# Patient Record
Sex: FEMALE | Race: WHITE | NOT HISPANIC OR LATINO
[De-identification: names, ages, dates, MRNs, and addresses within clinical notes are randomized per-mention and may not be internally consistent; named-entity substitution may affect disease eponyms.]

---

## 2020-05-01 ENCOUNTER — TRANSCRIPTION ENCOUNTER (OUTPATIENT)
Age: 85
End: 2020-05-01

## 2024-11-26 ENCOUNTER — NON-APPOINTMENT (OUTPATIENT)
Age: 89
End: 2024-11-26

## 2024-11-26 ENCOUNTER — APPOINTMENT (OUTPATIENT)
Dept: UROLOGY | Facility: CLINIC | Age: 89
End: 2024-11-26
Payer: MEDICARE

## 2024-11-26 VITALS
RESPIRATION RATE: 16 BRPM | OXYGEN SATURATION: 96 % | DIASTOLIC BLOOD PRESSURE: 82 MMHG | SYSTOLIC BLOOD PRESSURE: 142 MMHG | BODY MASS INDEX: 31.15 KG/M2 | WEIGHT: 165 LBS | HEIGHT: 61 IN | HEART RATE: 65 BPM

## 2024-11-26 PROCEDURE — 99204 OFFICE O/P NEW MOD 45 MIN: CPT

## 2024-11-26 PROCEDURE — G2211 COMPLEX E/M VISIT ADD ON: CPT

## 2024-11-26 RX ORDER — METHENAMINE HIPPURATE 1 G/1
1 TABLET ORAL DAILY
Qty: 30 | Refills: 5 | Status: ACTIVE | COMMUNITY
Start: 2024-11-26 | End: 1900-01-01

## 2024-11-26 RX ORDER — CONJUGATED ESTROGENS 0.62 MG/G
0.62 CREAM VAGINAL
Qty: 1 | Refills: 2 | Status: ACTIVE | COMMUNITY
Start: 2024-11-26 | End: 1900-01-01

## 2024-11-27 LAB
APPEARANCE: CLEAR
BACTERIA: NEGATIVE /HPF
BILIRUBIN URINE: NEGATIVE
BLOOD URINE: NEGATIVE
CAST: 2 /LPF
COLOR: YELLOW
EPITHELIAL CELLS: 2 /HPF
GLUCOSE QUALITATIVE U: NEGATIVE MG/DL
KETONES URINE: NEGATIVE MG/DL
LEUKOCYTE ESTERASE URINE: ABNORMAL
MICROSCOPIC-UA: NORMAL
NITRITE URINE: NEGATIVE
PH URINE: 7
PROTEIN URINE: NEGATIVE MG/DL
RED BLOOD CELLS URINE: 2 /HPF
SPECIFIC GRAVITY URINE: 1.01
UROBILINOGEN URINE: 0.2 MG/DL
WHITE BLOOD CELLS URINE: 85 /HPF

## 2024-11-29 LAB — BACTERIA UR CULT: NORMAL

## 2024-12-05 ENCOUNTER — OUTPATIENT (OUTPATIENT)
Dept: OUTPATIENT SERVICES | Facility: HOSPITAL | Age: 89
LOS: 1 days | End: 2024-12-05
Payer: MEDICARE

## 2024-12-05 ENCOUNTER — APPOINTMENT (OUTPATIENT)
Dept: UROLOGY | Facility: CLINIC | Age: 89
End: 2024-12-05
Payer: MEDICARE

## 2024-12-05 VITALS
RESPIRATION RATE: 16 BRPM | SYSTOLIC BLOOD PRESSURE: 141 MMHG | OXYGEN SATURATION: 96 % | BODY MASS INDEX: 31.15 KG/M2 | HEART RATE: 79 BPM | HEIGHT: 61 IN | DIASTOLIC BLOOD PRESSURE: 84 MMHG | WEIGHT: 165 LBS

## 2024-12-05 DIAGNOSIS — Z98.49 CATARACT EXTRACTION STATUS, UNSPECIFIED EYE: Chronic | ICD-10-CM

## 2024-12-05 DIAGNOSIS — Z96.659 PRESENCE OF UNSPECIFIED ARTIFICIAL KNEE JOINT: Chronic | ICD-10-CM

## 2024-12-05 DIAGNOSIS — N39.0 URINARY TRACT INFECTION, SITE NOT SPECIFIED: ICD-10-CM

## 2024-12-05 DIAGNOSIS — R35.0 FREQUENCY OF MICTURITION: ICD-10-CM

## 2024-12-05 LAB — FUNGUS SPEC CULT ORG #8: NORMAL

## 2024-12-05 PROCEDURE — 51702 INSERT TEMP BLADDER CATH: CPT

## 2024-12-06 ENCOUNTER — NON-APPOINTMENT (OUTPATIENT)
Age: 89
End: 2024-12-06

## 2024-12-06 LAB
APPEARANCE: ABNORMAL
BACTERIA: ABNORMAL /HPF
BILIRUBIN URINE: NEGATIVE
BLOOD URINE: ABNORMAL
CAST: 2 /LPF
COLOR: YELLOW
EPITHELIAL CELLS: 1 /HPF
GLUCOSE QUALITATIVE U: NEGATIVE MG/DL
KETONES URINE: NEGATIVE MG/DL
LEUKOCYTE ESTERASE URINE: ABNORMAL
MICROSCOPIC-UA: NORMAL
NITRITE URINE: NEGATIVE
PH URINE: 6
PROTEIN URINE: 100 MG/DL
RED BLOOD CELLS URINE: 64 /HPF
SPECIFIC GRAVITY URINE: 1.02
UROBILINOGEN URINE: 0.2 MG/DL
WHITE BLOOD CELLS URINE: >998 /HPF

## 2024-12-06 RX ORDER — AMOXICILLIN/POTASSIUM CLAV 875-125 MG
1 TABLET ORAL
Qty: 14 | Refills: 0 | DISCHARGE
Start: 2024-12-06 | End: 2024-12-12

## 2024-12-06 RX ORDER — AMOXICILLIN/POTASSIUM CLAV 250-125 MG
1 TABLET ORAL
Qty: 14 | Refills: 0
Start: 2024-12-06 | End: 2024-12-12

## 2024-12-09 ENCOUNTER — NON-APPOINTMENT (OUTPATIENT)
Age: 89
End: 2024-12-09

## 2024-12-10 DIAGNOSIS — N39.0 URINARY TRACT INFECTION, SITE NOT SPECIFIED: ICD-10-CM

## 2024-12-10 LAB — BACTERIA UR CULT: ABNORMAL

## 2024-12-26 ENCOUNTER — INPATIENT (INPATIENT)
Facility: HOSPITAL | Age: 89
LOS: 3 days | Discharge: HOME HEALTH | DRG: 690 | End: 2024-12-30
Attending: STUDENT IN AN ORGANIZED HEALTH CARE EDUCATION/TRAINING PROGRAM | Admitting: STUDENT IN AN ORGANIZED HEALTH CARE EDUCATION/TRAINING PROGRAM
Payer: MEDICARE

## 2024-12-26 VITALS
DIASTOLIC BLOOD PRESSURE: 66 MMHG | TEMPERATURE: 98 F | OXYGEN SATURATION: 100 % | RESPIRATION RATE: 18 BRPM | WEIGHT: 165.35 LBS | HEART RATE: 66 BPM | SYSTOLIC BLOOD PRESSURE: 148 MMHG

## 2024-12-26 DIAGNOSIS — Z98.49 CATARACT EXTRACTION STATUS, UNSPECIFIED EYE: Chronic | ICD-10-CM

## 2024-12-26 DIAGNOSIS — Z96.659 PRESENCE OF UNSPECIFIED ARTIFICIAL KNEE JOINT: Chronic | ICD-10-CM

## 2024-12-26 LAB
ALBUMIN SERPL ELPH-MCNC: 3.3 G/DL — SIGNIFICANT CHANGE UP (ref 3.3–5)
ALP SERPL-CCNC: 78 U/L — SIGNIFICANT CHANGE UP (ref 40–120)
ALT FLD-CCNC: 13 U/L — SIGNIFICANT CHANGE UP (ref 12–78)
ANION GAP SERPL CALC-SCNC: 5 MMOL/L — SIGNIFICANT CHANGE UP (ref 5–17)
APTT BLD: 33.8 SEC — SIGNIFICANT CHANGE UP (ref 24.5–35.6)
AST SERPL-CCNC: 18 U/L — SIGNIFICANT CHANGE UP (ref 15–37)
BASOPHILS # BLD AUTO: 0.07 K/UL — SIGNIFICANT CHANGE UP (ref 0–0.2)
BASOPHILS # BLD AUTO: 0.09 K/UL — SIGNIFICANT CHANGE UP (ref 0–0.2)
BASOPHILS NFR BLD AUTO: 0.4 % — SIGNIFICANT CHANGE UP (ref 0–2)
BASOPHILS NFR BLD AUTO: 0.5 % — SIGNIFICANT CHANGE UP (ref 0–2)
BILIRUB SERPL-MCNC: 1.4 MG/DL — HIGH (ref 0.2–1.2)
BUN SERPL-MCNC: 28 MG/DL — HIGH (ref 7–23)
CALCIUM SERPL-MCNC: 9 MG/DL — SIGNIFICANT CHANGE UP (ref 8.5–10.1)
CHLORIDE SERPL-SCNC: 101 MMOL/L — SIGNIFICANT CHANGE UP (ref 96–108)
CO2 SERPL-SCNC: 28 MMOL/L — SIGNIFICANT CHANGE UP (ref 22–31)
CREAT SERPL-MCNC: 1.44 MG/DL — HIGH (ref 0.5–1.3)
EGFR: 35 ML/MIN/1.73M2 — LOW
EOSINOPHIL # BLD AUTO: 0.12 K/UL — SIGNIFICANT CHANGE UP (ref 0–0.5)
EOSINOPHIL # BLD AUTO: 0.15 K/UL — SIGNIFICANT CHANGE UP (ref 0–0.5)
EOSINOPHIL NFR BLD AUTO: 0.7 % — SIGNIFICANT CHANGE UP (ref 0–6)
EOSINOPHIL NFR BLD AUTO: 0.9 % — SIGNIFICANT CHANGE UP (ref 0–6)
GLUCOSE SERPL-MCNC: 152 MG/DL — HIGH (ref 70–99)
HCT VFR BLD CALC: 39.1 % — SIGNIFICANT CHANGE UP (ref 34.5–45)
HCT VFR BLD CALC: 40.4 % — SIGNIFICANT CHANGE UP (ref 34.5–45)
HGB BLD-MCNC: 13.1 G/DL — SIGNIFICANT CHANGE UP (ref 11.5–15.5)
HGB BLD-MCNC: 13.4 G/DL — SIGNIFICANT CHANGE UP (ref 11.5–15.5)
IMM GRANULOCYTES NFR BLD AUTO: 0.5 % — SIGNIFICANT CHANGE UP (ref 0–0.9)
IMM GRANULOCYTES NFR BLD AUTO: 0.6 % — SIGNIFICANT CHANGE UP (ref 0–0.9)
INR BLD: 0.91 RATIO — SIGNIFICANT CHANGE UP (ref 0.85–1.16)
LACTATE SERPL-SCNC: 1.2 MMOL/L — SIGNIFICANT CHANGE UP (ref 0.7–2)
LACTATE SERPL-SCNC: 1.4 MMOL/L — SIGNIFICANT CHANGE UP (ref 0.7–2)
LYMPHOCYTES # BLD AUTO: 0.7 K/UL — LOW (ref 1–3.3)
LYMPHOCYTES # BLD AUTO: 0.74 K/UL — LOW (ref 1–3.3)
LYMPHOCYTES # BLD AUTO: 4.2 % — LOW (ref 13–44)
LYMPHOCYTES # BLD AUTO: 4.2 % — LOW (ref 13–44)
MAGNESIUM SERPL-MCNC: 2.6 MG/DL — SIGNIFICANT CHANGE UP (ref 1.6–2.6)
MCHC RBC-ENTMCNC: 32.9 PG — SIGNIFICANT CHANGE UP (ref 27–34)
MCHC RBC-ENTMCNC: 33.2 G/DL — SIGNIFICANT CHANGE UP (ref 32–36)
MCHC RBC-ENTMCNC: 33.2 PG — SIGNIFICANT CHANGE UP (ref 27–34)
MCHC RBC-ENTMCNC: 33.5 G/DL — SIGNIFICANT CHANGE UP (ref 32–36)
MCV RBC AUTO: 99.2 FL — SIGNIFICANT CHANGE UP (ref 80–100)
MCV RBC AUTO: 99.3 FL — SIGNIFICANT CHANGE UP (ref 80–100)
MONOCYTES # BLD AUTO: 1.37 K/UL — HIGH (ref 0–0.9)
MONOCYTES # BLD AUTO: 1.48 K/UL — HIGH (ref 0–0.9)
MONOCYTES NFR BLD AUTO: 8.2 % — SIGNIFICANT CHANGE UP (ref 2–14)
MONOCYTES NFR BLD AUTO: 8.4 % — SIGNIFICANT CHANGE UP (ref 2–14)
NEUTROPHILS # BLD AUTO: 14.34 K/UL — HIGH (ref 1.8–7.4)
NEUTROPHILS # BLD AUTO: 15.12 K/UL — HIGH (ref 1.8–7.4)
NEUTROPHILS NFR BLD AUTO: 85.6 % — HIGH (ref 43–77)
NEUTROPHILS NFR BLD AUTO: 85.8 % — HIGH (ref 43–77)
PLATELET # BLD AUTO: 169 K/UL — SIGNIFICANT CHANGE UP (ref 150–400)
PLATELET # BLD AUTO: 172 K/UL — SIGNIFICANT CHANGE UP (ref 150–400)
POTASSIUM SERPL-MCNC: 4.5 MMOL/L — SIGNIFICANT CHANGE UP (ref 3.5–5.3)
POTASSIUM SERPL-SCNC: 4.5 MMOL/L — SIGNIFICANT CHANGE UP (ref 3.5–5.3)
PROT SERPL-MCNC: 6.8 GM/DL — SIGNIFICANT CHANGE UP (ref 6–8.3)
PROTHROM AB SERPL-ACNC: 10.7 SEC — SIGNIFICANT CHANGE UP (ref 9.9–13.4)
RBC # BLD: 3.94 M/UL — SIGNIFICANT CHANGE UP (ref 3.8–5.2)
RBC # BLD: 4.07 M/UL — SIGNIFICANT CHANGE UP (ref 3.8–5.2)
RBC # FLD: 13.8 % — SIGNIFICANT CHANGE UP (ref 10.3–14.5)
RBC # FLD: 13.8 % — SIGNIFICANT CHANGE UP (ref 10.3–14.5)
SODIUM SERPL-SCNC: 134 MMOL/L — LOW (ref 135–145)
WBC # BLD: 16.75 K/UL — HIGH (ref 3.8–10.5)
WBC # BLD: 17.61 K/UL — HIGH (ref 3.8–10.5)
WBC # FLD AUTO: 16.75 K/UL — HIGH (ref 3.8–10.5)
WBC # FLD AUTO: 17.61 K/UL — HIGH (ref 3.8–10.5)

## 2024-12-26 PROCEDURE — 71045 X-RAY EXAM CHEST 1 VIEW: CPT | Mod: 26

## 2024-12-26 PROCEDURE — 99285 EMERGENCY DEPT VISIT HI MDM: CPT

## 2024-12-26 RX ORDER — CEFTRIAXONE SODIUM 1 G/1
1000 INJECTION, POWDER, FOR SOLUTION INTRAMUSCULAR; INTRAVENOUS ONCE
Refills: 0 | Status: DISCONTINUED | OUTPATIENT
Start: 2024-12-26 | End: 2024-12-26

## 2024-12-26 RX ORDER — ACETAMINOPHEN 80 MG/.8ML
650 SOLUTION/ DROPS ORAL ONCE
Refills: 0 | Status: COMPLETED | OUTPATIENT
Start: 2024-12-26 | End: 2024-12-26

## 2024-12-26 RX ORDER — CEFTRIAXONE SODIUM 1 G/1
1000 INJECTION, POWDER, FOR SOLUTION INTRAMUSCULAR; INTRAVENOUS ONCE
Refills: 0 | Status: COMPLETED | OUTPATIENT
Start: 2024-12-26 | End: 2024-12-26

## 2024-12-26 RX ORDER — SODIUM CHLORIDE 9 MG/ML
250 INJECTION, SOLUTION INTRAMUSCULAR; INTRAVENOUS; SUBCUTANEOUS ONCE
Refills: 0 | Status: COMPLETED | OUTPATIENT
Start: 2024-12-26 | End: 2024-12-26

## 2024-12-26 RX ORDER — SODIUM CHLORIDE 9 MG/ML
1500 INJECTION, SOLUTION INTRAMUSCULAR; INTRAVENOUS; SUBCUTANEOUS ONCE
Refills: 0 | Status: DISCONTINUED | OUTPATIENT
Start: 2024-12-26 | End: 2024-12-26

## 2024-12-26 RX ADMIN — CEFTRIAXONE SODIUM 1000 MILLIGRAM(S): 1 INJECTION, POWDER, FOR SOLUTION INTRAMUSCULAR; INTRAVENOUS at 23:04

## 2024-12-26 RX ADMIN — ACETAMINOPHEN 650 MILLIGRAM(S): 80 SOLUTION/ DROPS ORAL at 22:59

## 2024-12-26 RX ADMIN — SODIUM CHLORIDE 250 MILLILITER(S): 9 INJECTION, SOLUTION INTRAMUSCULAR; INTRAVENOUS; SUBCUTANEOUS at 22:59

## 2024-12-26 NOTE — ED PROVIDER NOTE - CLINICAL SUMMARY MEDICAL DECISION MAKING FREE TEXT BOX
88 y/o F with PMHx of GERD, OA, HTN, HLD, Afib s/p cardioversion, DM, CHF, chronic UTIs presents to ED  c/o fever, tiredness, and confusion starting today.  Pt states she has cloudy urine. Last UTI was 3 weeks ago and was treated with amoxiclav for 1 week which concluded 2 weeks ago. Pt notes temp of 100.8F at 6pm at home today. Last took tylenol at 5pm today. Denies dysuria, abd pain, cough, runny nose, sore throat. Allergy to bactrim and levaquin. Pt is on Lasix alternating 20mg/40mg, sotalol 80 mg, lovastatin.     Urologist: Dr. Dylan Raphael at [place].   PCP: Dr. Márquez    Plan: Do labs, urine, CXR, blood culture 88 y/o F with PMHx of GERD, OA, HTN, HLD, Afib s/p cardioversion, DM, CHF, chronic UTIs presents to ED  c/o fever, tiredness, and confusion starting today.  Pt states she has cloudy urine. Last UTI was 3 weeks ago and was treated with amoxiclav for 1 week which concluded 2 weeks ago. Pt notes temp of 100.8F at 6pm at home today. Last took tylenol 1000mg at 5pm today. Denies dysuria, abd pain, cough, runny nose, sore throat. Allergy to bactrim and levaquin. Pt is on Lasix alternating 20mg/40mg, sotalol 80 mg, lovastatin.     Urologist: Dr. Dylan Raphael at [place].   PCP: Dr. Márquez    Plan: R/o UTI, sepsis. Give IV fluids limited secondary to CHF. Pt denies allergy to penicillin. Order ceftriaxone for UTI coverage. Do Labs urine reassess

## 2024-12-26 NOTE — ED PROVIDER NOTE - NSICDXPASTMEDICALHX_GEN_ALL_CORE_FT
PAST MEDICAL HISTORY:  Atrial fibrillation s/p cardioversion    Diabetes     GERD (gastroesophageal reflux disease)     HTN (hypertension)     Hyperlipidemia low HDL    Osteoarthritis       Atrial fibrillation status post cardioversion      PAST MEDICAL HISTORY:  Atrial fibrillation s/p cardioversion    Diabetes     GERD (gastroesophageal reflux disease)     HTN (hypertension)     Hyperlipidemia low HDL    Osteoarthritis       Atrial fibrillation status post cardioversion     Congestive heart failure

## 2024-12-26 NOTE — ED ADULT TRIAGE NOTE - CHIEF COMPLAINT QUOTE
chronic UTIs, last 2 weeks ago treated with amoxiclav. complains of fever, tiredness and mild AMS today, temp of 100.8 at 6pm. last took tylenol at 5pm.   urologist: dr thierry evans at [place]. history of chronic UTIs leading to sepsis, spoke to dr. suarez told to come to ED.

## 2024-12-27 ENCOUNTER — TRANSCRIPTION ENCOUNTER (OUTPATIENT)
Age: 89
End: 2024-12-27

## 2024-12-27 DIAGNOSIS — N39.0 URINARY TRACT INFECTION, SITE NOT SPECIFIED: ICD-10-CM

## 2024-12-27 LAB
ANION GAP SERPL CALC-SCNC: 3 MMOL/L — LOW (ref 5–17)
APPEARANCE UR: ABNORMAL
BILIRUB UR-MCNC: NEGATIVE — SIGNIFICANT CHANGE UP
BUN SERPL-MCNC: 27 MG/DL — HIGH (ref 7–23)
CALCIUM SERPL-MCNC: 9 MG/DL — SIGNIFICANT CHANGE UP (ref 8.5–10.1)
CHLORIDE SERPL-SCNC: 102 MMOL/L — SIGNIFICANT CHANGE UP (ref 96–108)
CO2 SERPL-SCNC: 30 MMOL/L — SIGNIFICANT CHANGE UP (ref 22–31)
COLOR SPEC: YELLOW — SIGNIFICANT CHANGE UP
CREAT SERPL-MCNC: 1.34 MG/DL — HIGH (ref 0.5–1.3)
DIFF PNL FLD: ABNORMAL
E COLI DNA BLD POS QL NAA+NON-PROBE: SIGNIFICANT CHANGE UP
EGFR: 38 ML/MIN/1.73M2 — LOW
FLUAV AG NPH QL: SIGNIFICANT CHANGE UP
FLUBV AG NPH QL: SIGNIFICANT CHANGE UP
GLUCOSE SERPL-MCNC: 145 MG/DL — HIGH (ref 70–99)
GLUCOSE UR QL: NEGATIVE MG/DL — SIGNIFICANT CHANGE UP
GRAM STN FLD: ABNORMAL
KETONES UR-MCNC: NEGATIVE MG/DL — SIGNIFICANT CHANGE UP
LEUKOCYTE ESTERASE UR-ACNC: ABNORMAL
MAGNESIUM SERPL-MCNC: 2.5 MG/DL — SIGNIFICANT CHANGE UP (ref 1.6–2.6)
METHOD TYPE: SIGNIFICANT CHANGE UP
NITRITE UR-MCNC: NEGATIVE — SIGNIFICANT CHANGE UP
PH UR: 6.5 — SIGNIFICANT CHANGE UP (ref 5–8)
PHOSPHATE SERPL-MCNC: 2.7 MG/DL — SIGNIFICANT CHANGE UP (ref 2.5–4.5)
POTASSIUM SERPL-MCNC: 4 MMOL/L — SIGNIFICANT CHANGE UP (ref 3.5–5.3)
POTASSIUM SERPL-SCNC: 4 MMOL/L — SIGNIFICANT CHANGE UP (ref 3.5–5.3)
PROT UR-MCNC: 300 MG/DL
RSV RNA NPH QL NAA+NON-PROBE: SIGNIFICANT CHANGE UP
SARS-COV-2 RNA SPEC QL NAA+PROBE: SIGNIFICANT CHANGE UP
SODIUM SERPL-SCNC: 135 MMOL/L — SIGNIFICANT CHANGE UP (ref 135–145)
SP GR SPEC: 1.02 — SIGNIFICANT CHANGE UP (ref 1–1.03)
SPECIMEN SOURCE: SIGNIFICANT CHANGE UP
UROBILINOGEN FLD QL: 0.2 MG/DL — SIGNIFICANT CHANGE UP (ref 0.2–1)

## 2024-12-27 PROCEDURE — 76700 US EXAM ABDOM COMPLETE: CPT

## 2024-12-27 PROCEDURE — 99223 1ST HOSP IP/OBS HIGH 75: CPT | Mod: AI

## 2024-12-27 PROCEDURE — 87040 BLOOD CULTURE FOR BACTERIA: CPT

## 2024-12-27 PROCEDURE — 85027 COMPLETE CBC AUTOMATED: CPT

## 2024-12-27 PROCEDURE — 97116 GAIT TRAINING THERAPY: CPT | Mod: GP

## 2024-12-27 PROCEDURE — 84100 ASSAY OF PHOSPHORUS: CPT

## 2024-12-27 PROCEDURE — 36415 COLL VENOUS BLD VENIPUNCTURE: CPT

## 2024-12-27 PROCEDURE — 97162 PT EVAL MOD COMPLEX 30 MIN: CPT | Mod: GP

## 2024-12-27 PROCEDURE — 93010 ELECTROCARDIOGRAM REPORT: CPT

## 2024-12-27 PROCEDURE — 93005 ELECTROCARDIOGRAM TRACING: CPT

## 2024-12-27 PROCEDURE — 80048 BASIC METABOLIC PNL TOTAL CA: CPT

## 2024-12-27 PROCEDURE — 83735 ASSAY OF MAGNESIUM: CPT

## 2024-12-27 PROCEDURE — 85025 COMPLETE CBC W/AUTO DIFF WBC: CPT

## 2024-12-27 PROCEDURE — 94640 AIRWAY INHALATION TREATMENT: CPT

## 2024-12-27 RX ORDER — ACETAMINOPHEN 80 MG/.8ML
0 SOLUTION/ DROPS ORAL
Refills: 0 | DISCHARGE

## 2024-12-27 RX ORDER — BISACODYL 5 MG
5 TABLET, DELAYED RELEASE (ENTERIC COATED) ORAL AT BEDTIME
Refills: 0 | Status: DISCONTINUED | OUTPATIENT
Start: 2024-12-27 | End: 2024-12-30

## 2024-12-27 RX ORDER — POTASSIUM CHLORIDE 600 MG/1
1 TABLET, FILM COATED, EXTENDED RELEASE ORAL
Refills: 0 | DISCHARGE

## 2024-12-27 RX ORDER — MAG HYDROX/ALUMINUM HYD/SIMETH 200-200-20
30 SUSPENSION, ORAL (FINAL DOSE FORM) ORAL ONCE
Refills: 0 | Status: COMPLETED | OUTPATIENT
Start: 2024-12-27 | End: 2024-12-27

## 2024-12-27 RX ORDER — ESTROGENS, CONJUGATED 0.625 MG/G
1 CREAM (GRAM) VAGINAL
Refills: 0 | DISCHARGE

## 2024-12-27 RX ORDER — SOTALOL HYDROCHLORIDE 160 MG/1
80 TABLET ORAL EVERY 12 HOURS
Refills: 0 | Status: DISCONTINUED | OUTPATIENT
Start: 2024-12-27 | End: 2024-12-28

## 2024-12-27 RX ORDER — CEFTRIAXONE SODIUM 1 G/1
1000 INJECTION, POWDER, FOR SOLUTION INTRAMUSCULAR; INTRAVENOUS EVERY 24 HOURS
Refills: 0 | Status: DISCONTINUED | OUTPATIENT
Start: 2024-12-27 | End: 2024-12-28

## 2024-12-27 RX ORDER — BACILLUS COAGULANS/INULIN 1B-250 MG
2 CAPSULE ORAL
Refills: 0 | DISCHARGE

## 2024-12-27 RX ORDER — LIDOCAINE 50 MG/G
1 OINTMENT TOPICAL EVERY 24 HOURS
Refills: 0 | Status: DISCONTINUED | OUTPATIENT
Start: 2024-12-27 | End: 2024-12-30

## 2024-12-27 RX ORDER — FLUTICASONE PROPIONATE 50 UG/1
2 SPRAY, METERED NASAL
Refills: 0 | DISCHARGE

## 2024-12-27 RX ORDER — DICLOFENAC SODIUM 1 %
4 GEL (GRAM) TOPICAL
Refills: 0 | DISCHARGE

## 2024-12-27 RX ORDER — PANTOPRAZOLE 40 MG/1
1 TABLET, DELAYED RELEASE ORAL
Refills: 0 | DISCHARGE

## 2024-12-27 RX ORDER — IPRATROPIUM BROMIDE 0.2 MG/ML
2 SOLUTION, NON-ORAL INHALATION
Refills: 0 | DISCHARGE

## 2024-12-27 RX ORDER — FAMOTIDINE 20 MG/1
1 TABLET, FILM COATED ORAL
Refills: 0 | DISCHARGE

## 2024-12-27 RX ORDER — SENNOSIDES 8.6 MG/1
2 TABLET, FILM COATED ORAL AT BEDTIME
Refills: 0 | Status: DISCONTINUED | OUTPATIENT
Start: 2024-12-27 | End: 2024-12-30

## 2024-12-27 RX ORDER — LIDOCAINE 50 MG/G
0 OINTMENT TOPICAL
Refills: 0 | DISCHARGE

## 2024-12-27 RX ORDER — SENNOSIDES 8.6 MG/1
1 TABLET, FILM COATED ORAL
Refills: 0 | DISCHARGE

## 2024-12-27 RX ORDER — ASPIRIN 81 MG
1 TABLET, DELAYED RELEASE (ENTERIC COATED) ORAL
Refills: 0 | DISCHARGE

## 2024-12-27 RX ORDER — IBUPROFEN 200 MG
600 TABLET ORAL ONCE
Refills: 0 | Status: COMPLETED | OUTPATIENT
Start: 2024-12-27 | End: 2024-12-27

## 2024-12-27 RX ORDER — ATORVASTATIN CALCIUM 40 MG/1
10 TABLET, FILM COATED ORAL AT BEDTIME
Refills: 0 | Status: DISCONTINUED | OUTPATIENT
Start: 2024-12-27 | End: 2024-12-30

## 2024-12-27 RX ORDER — ACETAMINOPHEN 80 MG/.8ML
650 SOLUTION/ DROPS ORAL EVERY 6 HOURS
Refills: 0 | Status: DISCONTINUED | OUTPATIENT
Start: 2024-12-27 | End: 2024-12-30

## 2024-12-27 RX ORDER — ASCORBIC ACID 1000 MG
500 TABLET ORAL DAILY
Refills: 0 | Status: DISCONTINUED | OUTPATIENT
Start: 2024-12-27 | End: 2024-12-30

## 2024-12-27 RX ORDER — ASCORBIC ACID 1000 MG
1 TABLET ORAL
Refills: 0 | DISCHARGE

## 2024-12-27 RX ORDER — CHOLECALCIFEROL (VITAMIN D3) 10 MCG
400 TABLET ORAL DAILY
Refills: 0 | Status: DISCONTINUED | OUTPATIENT
Start: 2024-12-27 | End: 2024-12-30

## 2024-12-27 RX ORDER — FLUTICASONE PROPIONATE 50 UG/1
1 SPRAY, METERED NASAL
Refills: 0 | Status: DISCONTINUED | OUTPATIENT
Start: 2024-12-27 | End: 2024-12-30

## 2024-12-27 RX ORDER — FERROUS SULFATE 325(65) MG
325 TABLET ORAL DAILY
Refills: 0 | Status: DISCONTINUED | OUTPATIENT
Start: 2024-12-27 | End: 2024-12-30

## 2024-12-27 RX ORDER — PANTOPRAZOLE 40 MG/1
40 TABLET, DELAYED RELEASE ORAL
Refills: 0 | Status: DISCONTINUED | OUTPATIENT
Start: 2024-12-27 | End: 2024-12-30

## 2024-12-27 RX ORDER — POLYETHYLENE GLYCOL 3350 17 G/DOSE
17 POWDER (GRAM) ORAL
Refills: 0 | Status: DISCONTINUED | OUTPATIENT
Start: 2024-12-27 | End: 2024-12-30

## 2024-12-27 RX ORDER — CETIRIZINE HYDROCHLORIDE 5 MG/5ML
1 SYRUP ORAL
Refills: 0 | DISCHARGE

## 2024-12-27 RX ORDER — CHOLECALCIFEROL (VITAMIN D3) 10 MCG
1 TABLET ORAL
Refills: 0 | DISCHARGE

## 2024-12-27 RX ORDER — ASPIRIN 81 MG
81 TABLET, DELAYED RELEASE (ENTERIC COATED) ORAL DAILY
Refills: 0 | Status: DISCONTINUED | OUTPATIENT
Start: 2024-12-27 | End: 2024-12-30

## 2024-12-27 RX ADMIN — Medication 500 MILLIGRAM(S): at 10:51

## 2024-12-27 RX ADMIN — PANTOPRAZOLE 40 MILLIGRAM(S): 40 TABLET, DELAYED RELEASE ORAL at 22:30

## 2024-12-27 RX ADMIN — Medication 30 MILLILITER(S): at 17:05

## 2024-12-27 RX ADMIN — Medication 81 MILLIGRAM(S): at 10:47

## 2024-12-27 RX ADMIN — PANTOPRAZOLE 40 MILLIGRAM(S): 40 TABLET, DELAYED RELEASE ORAL at 10:47

## 2024-12-27 RX ADMIN — Medication 17 GRAM(S): at 10:47

## 2024-12-27 RX ADMIN — CEFTRIAXONE SODIUM 1000 MILLIGRAM(S): 1 INJECTION, POWDER, FOR SOLUTION INTRAMUSCULAR; INTRAVENOUS at 22:29

## 2024-12-27 RX ADMIN — LIDOCAINE 1 PATCH: 50 OINTMENT TOPICAL at 10:48

## 2024-12-27 RX ADMIN — Medication 400 UNIT(S): at 10:50

## 2024-12-27 RX ADMIN — LIDOCAINE 1 PATCH: 50 OINTMENT TOPICAL at 22:00

## 2024-12-27 RX ADMIN — ATORVASTATIN CALCIUM 10 MILLIGRAM(S): 40 TABLET, FILM COATED ORAL at 22:30

## 2024-12-27 RX ADMIN — Medication 5 MILLIGRAM(S): at 22:30

## 2024-12-27 RX ADMIN — SENNOSIDES 2 TABLET(S): 8.6 TABLET, FILM COATED ORAL at 22:30

## 2024-12-27 RX ADMIN — Medication 325 MILLIGRAM(S): at 10:48

## 2024-12-27 RX ADMIN — LIDOCAINE 1 PATCH: 50 OINTMENT TOPICAL at 19:00

## 2024-12-27 RX ADMIN — FLUTICASONE PROPIONATE 1 SPRAY(S): 50 SPRAY, METERED NASAL at 10:50

## 2024-12-27 RX ADMIN — Medication 600 MILLIGRAM(S): at 02:10

## 2024-12-27 NOTE — PATIENT PROFILE ADULT - PATIENT'S SEXUAL ORIENTATION
oriented to person, place and time , normal sensation , short and long term memory intact Withheld/Decline to Answer

## 2024-12-27 NOTE — H&P ADULT - NSHPPHYSICALEXAM_GEN_ALL_CORE
Vitals  T(F): 99.9 (12-27-24 @ 07:45), Max: 101 (12-27-24 @ 00:14)  HR: 62 (12-27-24 @ 10:46) (60 - 106)  BP: 106/56 (12-27-24 @ 10:46) (100/50 - 152/96)  RR: 18 (12-27-24 @ 07:45) (14 - 24)  SpO2: 95% (12-27-24 @ 07:45) (94% - 100%)    PHYSICAL EXAM   Gen: NAD, comfortable, AA&Ox4  HEENT: head atrumatic and normocephalic, EOMI  CVS: +s1, s2, regular rate and rhythm, no murmurs, rubs or gallops  Pulmonary: normal respiratory effort, clear to auscultation b/l, no wheezes/crackles/rhonchi  Abdomen: soft, non-tender, non-distended, +bowel sounds in all 4 quadrants, no masses noted, no guarding or rigidity   Back: no CVA tenderness   Extremities: no pedal edema, pedal pulses palpable  Skin: nl warm and dry, no wounds   Neuro: answering questions appropriately, face symmetric

## 2024-12-27 NOTE — ED ADULT NURSE NOTE - NSFALLRISKINTERV_ED_ALL_ED

## 2024-12-27 NOTE — PATIENT PROFILE ADULT - FALL HARM RISK - HARM RISK INTERVENTIONS

## 2024-12-27 NOTE — ED ADULT NURSE NOTE - OBJECTIVE STATEMENT
pt sent to ED my MD c/o UTI. pt has chronic UTIs, last 2 weeks ago treated with amoxiclav. complains of fever, tiredness and mild AMS today, temp of 100.8 at 6pm. last took tylenol at 5pm. Pt denies abd pain, back pain, n/v/d, CP, SOB at this time.

## 2024-12-27 NOTE — H&P ADULT - HISTORY OF PRESENT ILLNESS
CBC showing WBC 17.61, hemoglobin 13.1, hematocrit 39.1, platelet count 172.  Coagulation factors within normal limits.  Chemistry showing sodium 134, potassium 4.5, chloride 101, carbon dioxide 28, BUN 28, creatinine 1.44 (no prior creatinine on file for baseline), glucose 152, total bilirubin 1.4, LFTs within normal limits.  Initial lactate 1.2, later 1.4.  Urinalysis notable for greater than 998 white blood cells, urine culture pending.  Respiratory panel negative for COVID, flu A/B, RSV.    Imaging:  – AP portable chest x-ray showed no evidence of acute cardiopulmonary process    ED intervention:  – 1 g ceftriaxone given X1  – Motrin 600 mg p.o. X1, Tylenol 650 mg p.o. X1  – 250 cc bolus NS given    Patient admitted to medicine for treatment of urinary tract infection. 89-year-old female past medical history notable for recurrent urinary tract infections, atrial fibrillation status post cardioversion now with watchman (anticoagulation previously discontinued given hemorrhagic bursitis, renal hematoma 2019), hypertension, hyperlipidemia, GERD, esophageal stricture/achalasia status post endoscopic POEM 2017, osteoarthritis bilateral knees and shoulders, degenerative disc disease, was spinal stenosis, sciatica, who presents with 1 day history of feeling unwell with chills at home.  Patient reports that she does not have dysuria at this time but that she feels like she has a urinary tract infection.  Last UTI treated approximately 3 weeks prior, notes that she has been having poor p.o. intake over the last 1-2 days.  Otherwise denies chest pain, shortness of breath, palpitations, nausea, vomiting, or abdominal pain.    CBC showing WBC 17.61, hemoglobin 13.1, hematocrit 39.1, platelet count 172.  Coagulation factors within normal limits.  Chemistry showing sodium 134, potassium 4.5, chloride 101, carbon dioxide 28, BUN 28, creatinine 1.44 (no prior creatinine on file for baseline), glucose 152, total bilirubin 1.4, LFTs within normal limits.  Initial lactate 1.2, later 1.4.  Urinalysis notable for greater than 998 white blood cells, urine culture pending.  Respiratory panel negative for COVID, flu A/B, RSV.    Imaging:  – AP portable chest x-ray showed no evidence of acute cardiopulmonary process    ED intervention:  – 1 g ceftriaxone given X1  – Motrin 600 mg p.o. X1, Tylenol 650 mg p.o. X1  – 250 cc bolus NS given    Patient admitted to medicine for treatment of urinary tract infection.    Outpatient providers:   Cardiology: Dr. Romero ([facility])  Urology: Dr. yDlan Raphael   PCP: Dr. Márquez  89-year-old female past medical history notable for recurrent urinary tract infections, atrial fibrillation status post cardioversion now with watchman (anticoagulation previously discontinued given hemorrhagic bursitis, renal hematoma 2019), hemophilia C, hypertension, hyperlipidemia, GERD, esophageal stricture/achalasia status post endoscopic POEM 2017, osteoarthritis bilateral knees and shoulders, degenerative disc disease, was spinal stenosis, sciatica, who presents with 1 day history of feeling unwell with chills at home.  Patient reports that she does not have dysuria at this time but that she feels like she has a urinary tract infection.  Last UTI treated approximately 3 weeks prior, notes that she has been having poor p.o. intake over the last 1-2 days.  Otherwise denies chest pain, shortness of breath, palpitations, nausea, vomiting, or abdominal pain.    CBC showing WBC 17.61, hemoglobin 13.1, hematocrit 39.1, platelet count 172.  Coagulation factors within normal limits.  Chemistry showing sodium 134, potassium 4.5, chloride 101, carbon dioxide 28, BUN 28, creatinine 1.44 (no prior creatinine on file for baseline), glucose 152, total bilirubin 1.4, LFTs within normal limits.  Initial lactate 1.2, later 1.4.  Urinalysis notable for greater than 998 white blood cells, urine culture pending.  Respiratory panel negative for COVID, flu A/B, RSV.    Imaging:  – AP portable chest x-ray showed no evidence of acute cardiopulmonary process    ED intervention:  – 1 g ceftriaxone given X1  – Motrin 600 mg p.o. X1, Tylenol 650 mg p.o. X1  – 250 cc bolus NS given    Patient admitted to medicine for treatment of urinary tract infection.    Outpatient providers:   Cardiology: Dr. Romero ([facility])  Urology: Dr. Dylan Raphael   PCP: Dr. Márquez

## 2024-12-27 NOTE — H&P ADULT - NSHPREVIEWOFSYSTEMS_GEN_ALL_CORE
REVIEW OF SYSTEM    General: +chills, +fevers  Ophthalmologic: no acute visual symptoms  ENMT: Denies nasal congestion, difficulty swallowing   Respiratory and Thorax: denies cough or difficulty breathing  Cardiovascular: denies chest pain, denies SOB w exertion  Gastrointestinal: +constipation, denies diarrhea or abdominal pain   Genitourinary: denies dysuria   Musculoskeletal: +pain bilateral shoulders and knees	  Neurological: denies any focal weakness, confusion 	  Psychiatric: Denies depression/anxiety

## 2024-12-27 NOTE — PATIENT PROFILE ADULT - NSPROHMSYMPCOND_GEN_A_NUR
Patient states she has a home health aid to help her with ADLs due to loss of strength and dexterity in upper extremities/musculoskeletal

## 2024-12-27 NOTE — H&P ADULT - ASSESSMENT
89-year-old female past medical history notable for recurrent urinary tract infections, atrial fibrillation status post cardioversion now with watchman (anticoagulation previously discontinued given hemorrhagic bursitis, renal hematoma 2019), hypertension, hyperlipidemia, GERD, esophageal stricture/achalasia status post endoscopic POEM 2017, osteoarthritis bilateral knees and shoulders, degenerative disc disease, was spinal stenosis, sciatica, who presents with 1 day history of feeling unwell with chills at home.     Tmax 101 °F in ED, patient reports her presentation similar to prior urinary tract infections.    #Urinary tract infection, complicated (systemic symptoms including fever, history of recurrent infections)  #Sepsis, POA  – Patient endorses present symptoms consistent with prior UTIs  – Labs notable for WBC 17.61 on admission, urinalysis with greater than 998 WBCs  – Chest x-ray negative for any acute cardiopulmonary process, respiratory viral panel negative  – Will continue with ceftriaxone for now, follow-up urine cultures and blood cultures    #Acute kidney injury, likely prerenal  – Patient is unsure of her baseline creatinine but denies any history of CKD  – Creatinine 1.44 on admission, patient reports poor p.o. intake on days prior to admission  – Suspect prerenal etiology will continue to monitor    #Elevated total bilirubin  – Patient without evidence of positive Jacobsen's on physical exam  – States that she is eating and drinking well without difficulty or nausea at this time  – Defer further testing to outpatient    #Atrial fibrillation status post cardioversion 2014  #Status post watchman 2022  #Status post pacemaker 2021  – Patient presently not on any anticoagulation given history of renal hematoma and hemorrhagic bursitis in the past, Watchman 2021  – EKG on admission showing sinus rhythm with first-degree AV block (SD interval 232), no evidence of dropped beats  – Continue with home sotalol 80 mg twice daily, hold parameters; QTc 448 ms    #Hx of CHF, unknown last EF   – Per home medications, patient is on Lasix 40 mg once daily  – Hold home Lasix for now given JHOANA, bilateral lower extremities without evidence of pitting edema, denies dyspnea on exertion/orthopnea  – No 2D echo on file    #Osteoarthritis, bilateral shoulders and knees  #Degenerative disc disease  #Spinal stenosis  #Spondylolithiasis  #Sciatica  – Continue home Percocet 2.5 mg as needed every 6 hours  – Continue with home lidocaine patch    #?Coronary artery disease  – Patient follows with Dr. Romero ([facility])  – Denies chest pain, shortness of breath  – Continue with home aspirin for ?Primary prevention    #Esophageal stricture  #Achalasia  #GERD  #Status post pulm 2017  – Continue with home pantoprazole 40 mg twice daily    #Hyperlipidemia  – Continue with home lovastatin, converted to atorvastatin while admitted    #Iron deficiency anemia  – Continue with ferrous sulfate once daily    F: encourage PO intake   E: PRN   N: DASH diet   A: with assistance     DVT PPx: avoid chemical PPx given hx Hemophilia, SCDs for now   GI PPx: home pantoprazole twice daily     FULL CODE   HCP : Daughter Izzy [phone]    Dispo: likely to home pending Ucx and sensitivities  89-year-old female past medical history notable for recurrent urinary tract infections, atrial fibrillation status post cardioversion now with watchman (anticoagulation previously discontinued given hemorrhagic bursitis, renal hematoma 2019), hemophilia C, hypertension, hyperlipidemia, GERD, esophageal stricture/achalasia status post endoscopic POEM 2017, osteoarthritis bilateral knees and shoulders, degenerative disc disease, was spinal stenosis, sciatica, who presents with 1 day history of feeling unwell with chills at home.     Tmax 101 °F in ED, patient reports her presentation similar to prior urinary tract infections.    #Urinary tract infection, complicated (systemic symptoms including fever, history of recurrent infections)  #Sepsis, POA  – Patient endorses present symptoms consistent with prior UTIs  – Labs notable for WBC 17.61 on admission, urinalysis with greater than 998 WBCs  – Chest x-ray negative for any acute cardiopulmonary process, respiratory viral panel negative  – Will continue with ceftriaxone for now, follow-up urine cultures and blood cultures    #Acute kidney injury, likely prerenal  – Patient is unsure of her baseline creatinine but denies any history of CKD  – Creatinine 1.44 on admission, patient reports poor p.o. intake on days prior to admission  – Suspect prerenal etiology will continue to monitor    #Elevated total bilirubin  – Patient without evidence of positive Jacobsen's on physical exam  – States that she is eating and drinking well without difficulty or nausea at this time  – Defer further testing to outpatient    #Atrial fibrillation status post cardioversion 2014  #Status post watchman 2022  #Status post pacemaker 2021  – Patient presently not on any anticoagulation given history of renal hematoma and hemorrhagic bursitis in the past, Watchman 2021  – EKG on admission showing sinus rhythm with first-degree AV block (AR interval 232), no evidence of dropped beats  – Continue with home sotalol 80 mg twice daily, hold parameters; QTc 448 ms    #Hx of CHF, unknown last EF   – Per home medications, patient is on Lasix 40 mg once daily  – Hold home Lasix for now given JHOANA, bilateral lower extremities without evidence of pitting edema, denies dyspnea on exertion/orthopnea  – No 2D echo on file    #Osteoarthritis, bilateral shoulders and knees  #Degenerative disc disease  #Spinal stenosis  #Spondylolithiasis  #Sciatica  – Continue home Percocet 2.5 mg as needed every 6 hours  – Continue with home lidocaine patch    #?Coronary artery disease  – Patient follows with Dr. Romero ([facility])  – Denies chest pain, shortness of breath  – Continue with home aspirin for ?Primary prevention    #Esophageal stricture  #Achalasia  #GERD  #Status post pulm 2017  – Continue with home pantoprazole 40 mg twice daily    #Hyperlipidemia  – Continue with home lovastatin, converted to atorvastatin while admitted    #Iron deficiency anemia  – Continue with ferrous sulfate once daily    F: encourage PO intake   E: PRN   N: DASH diet   A: with assistance     DVT PPx: avoid chemical PPx given hx Hemophilia, SCDs for now   GI PPx: home pantoprazole twice daily     FULL CODE   HCP : Daughter Izzy [phone]    Dispo: likely to home pending Ucx and sensitivities

## 2024-12-27 NOTE — DISCHARGE NOTE NURSING/CASE MANAGEMENT/SOCIAL WORK - FINANCIAL ASSISTANCE
[facility] provides services at a reduced cost to those who are determined to be eligible through [facility]’s financial assistance program. Information regarding [facility]’s financial assistance program can be found by going to [email or web address] or by calling [phone].

## 2024-12-27 NOTE — DISCHARGE NOTE NURSING/CASE MANAGEMENT/SOCIAL WORK - NSDCPEFALRISK_GEN_ALL_CORE
For information on Fall & Injury Prevention, visit: [email or web address] OR  [email or web address] OR  https://www.cdc.gov/steadi/patient.html

## 2024-12-27 NOTE — ED ADULT NURSE NOTE - NSICDXPASTMEDICALHX_GEN_ALL_CORE_FT
PAST MEDICAL HISTORY:  Atrial fibrillation s/p cardioversion    Diabetes     GERD (gastroesophageal reflux disease)     HTN (hypertension)     Hyperlipidemia low HDL    Osteoarthritis       Atrial fibrillation status post cardioversion     Congestive heart failure

## 2024-12-27 NOTE — ED ADULT NURSE NOTE - CHIEF COMPLAINT QUOTE
chronic UTIs, last 2 weeks ago treated with amoxiclav. complains of fever, tiredness and mild AMS today, temp of 100.8 at 6pm. last took tylenol at 5pm.   urologist: dr thierry evans at [place]. history of chronic UTIs leading to sepsis, spoke to dr. suarez told to come to ED.

## 2024-12-27 NOTE — PHARMACOTHERAPY INTERVENTION NOTE - COMMENTS
Med rec completed with patient at the bedside. Reviewed all medications using Dr. [facility] Hx and updated accordingly. Reviewed allergies and OTC meds as well. Of note, patient filled methenamine hippurate 1 g tab QPM on 12/24; patient has stated she has been taking it until hospitalized. Movantik was filled 12/19 25 mg tab QD; however, patient endorses she is not taking. The potassium chloride is unclear with how the patient takes; most likely 10 mEq 4 times a week and 20 mEq (2 10 mEq tabs) 3 times a week. Furosemide is taken 40 mg 4 times a week and 20 mg 3 times a week.

## 2024-12-28 LAB
ANION GAP SERPL CALC-SCNC: 7 MMOL/L — SIGNIFICANT CHANGE UP (ref 5–17)
BUN SERPL-MCNC: 26 MG/DL — HIGH (ref 7–23)
CALCIUM SERPL-MCNC: 8.5 MG/DL — SIGNIFICANT CHANGE UP (ref 8.5–10.1)
CHLORIDE SERPL-SCNC: 97 MMOL/L — SIGNIFICANT CHANGE UP (ref 96–108)
CO2 SERPL-SCNC: 28 MMOL/L — SIGNIFICANT CHANGE UP (ref 22–31)
CREAT SERPL-MCNC: 1.37 MG/DL — HIGH (ref 0.5–1.3)
EGFR: 37 ML/MIN/1.73M2 — LOW
GLUCOSE SERPL-MCNC: 129 MG/DL — HIGH (ref 70–99)
HCT VFR BLD CALC: 32.3 % — LOW (ref 34.5–45)
HGB BLD-MCNC: 11 G/DL — LOW (ref 11.5–15.5)
MAGNESIUM SERPL-MCNC: 2.6 MG/DL — SIGNIFICANT CHANGE UP (ref 1.6–2.6)
MCHC RBC-ENTMCNC: 33.5 PG — SIGNIFICANT CHANGE UP (ref 27–34)
MCHC RBC-ENTMCNC: 34.1 G/DL — SIGNIFICANT CHANGE UP (ref 32–36)
MCV RBC AUTO: 98.5 FL — SIGNIFICANT CHANGE UP (ref 80–100)
PHOSPHATE SERPL-MCNC: 2.7 MG/DL — SIGNIFICANT CHANGE UP (ref 2.5–4.5)
PLATELET # BLD AUTO: 141 K/UL — LOW (ref 150–400)
POTASSIUM SERPL-MCNC: 3.8 MMOL/L — SIGNIFICANT CHANGE UP (ref 3.5–5.3)
POTASSIUM SERPL-SCNC: 3.8 MMOL/L — SIGNIFICANT CHANGE UP (ref 3.5–5.3)
RBC # BLD: 3.28 M/UL — LOW (ref 3.8–5.2)
RBC # FLD: 14 % — SIGNIFICANT CHANGE UP (ref 10.3–14.5)
SODIUM SERPL-SCNC: 132 MMOL/L — LOW (ref 135–145)
WBC # BLD: 14.12 K/UL — HIGH (ref 3.8–10.5)
WBC # FLD AUTO: 14.12 K/UL — HIGH (ref 3.8–10.5)

## 2024-12-28 PROCEDURE — 99233 SBSQ HOSP IP/OBS HIGH 50: CPT

## 2024-12-28 RX ORDER — SOTALOL HYDROCHLORIDE 160 MG/1
80 TABLET ORAL EVERY 12 HOURS
Refills: 0 | Status: DISCONTINUED | OUTPATIENT
Start: 2024-12-28 | End: 2024-12-30

## 2024-12-28 RX ORDER — SODIUM CHLORIDE 9 MG/ML
500 INJECTION, SOLUTION INTRAMUSCULAR; INTRAVENOUS; SUBCUTANEOUS ONCE
Refills: 0 | Status: COMPLETED | OUTPATIENT
Start: 2024-12-28 | End: 2024-12-28

## 2024-12-28 RX ORDER — CEFTRIAXONE SODIUM 1 G/1
2000 INJECTION, POWDER, FOR SOLUTION INTRAMUSCULAR; INTRAVENOUS EVERY 24 HOURS
Refills: 0 | Status: DISCONTINUED | OUTPATIENT
Start: 2024-12-28 | End: 2024-12-30

## 2024-12-28 RX ORDER — IPRATROPIUM BROMIDE AND ALBUTEROL SULFATE .5; 2.5 MG/3ML; MG/3ML
3 SOLUTION RESPIRATORY (INHALATION) ONCE
Refills: 0 | Status: COMPLETED | OUTPATIENT
Start: 2024-12-28 | End: 2024-12-28

## 2024-12-28 RX ADMIN — PANTOPRAZOLE 40 MILLIGRAM(S): 40 TABLET, DELAYED RELEASE ORAL at 21:55

## 2024-12-28 RX ADMIN — SOTALOL HYDROCHLORIDE 80 MILLIGRAM(S): 160 TABLET ORAL at 09:53

## 2024-12-28 RX ADMIN — IPRATROPIUM BROMIDE AND ALBUTEROL SULFATE 3 MILLILITER(S): .5; 2.5 SOLUTION RESPIRATORY (INHALATION) at 22:38

## 2024-12-28 RX ADMIN — FLUTICASONE PROPIONATE 1 SPRAY(S): 50 SPRAY, METERED NASAL at 09:47

## 2024-12-28 RX ADMIN — Medication 400 UNIT(S): at 09:36

## 2024-12-28 RX ADMIN — SENNOSIDES 2 TABLET(S): 8.6 TABLET, FILM COATED ORAL at 21:55

## 2024-12-28 RX ADMIN — Medication 5 MILLIGRAM(S): at 21:55

## 2024-12-28 RX ADMIN — Medication 500 MILLIGRAM(S): at 09:36

## 2024-12-28 RX ADMIN — Medication 325 MILLIGRAM(S): at 09:36

## 2024-12-28 RX ADMIN — Medication 17 GRAM(S): at 09:35

## 2024-12-28 RX ADMIN — Medication 81 MILLIGRAM(S): at 09:35

## 2024-12-28 RX ADMIN — PANTOPRAZOLE 40 MILLIGRAM(S): 40 TABLET, DELAYED RELEASE ORAL at 09:35

## 2024-12-28 RX ADMIN — ATORVASTATIN CALCIUM 10 MILLIGRAM(S): 40 TABLET, FILM COATED ORAL at 21:56

## 2024-12-28 RX ADMIN — LIDOCAINE 1 PATCH: 50 OINTMENT TOPICAL at 19:27

## 2024-12-28 RX ADMIN — LIDOCAINE 1 PATCH: 50 OINTMENT TOPICAL at 22:51

## 2024-12-28 RX ADMIN — SOTALOL HYDROCHLORIDE 80 MILLIGRAM(S): 160 TABLET ORAL at 21:55

## 2024-12-28 RX ADMIN — LIDOCAINE 1 PATCH: 50 OINTMENT TOPICAL at 09:53

## 2024-12-28 RX ADMIN — CEFTRIAXONE SODIUM 2000 MILLIGRAM(S): 1 INJECTION, POWDER, FOR SOLUTION INTRAMUSCULAR; INTRAVENOUS at 09:53

## 2024-12-28 RX ADMIN — SODIUM CHLORIDE 250 MILLILITER(S): 9 INJECTION, SOLUTION INTRAMUSCULAR; INTRAVENOUS; SUBCUTANEOUS at 12:42

## 2024-12-28 RX ADMIN — Medication 17 GRAM(S): at 21:54

## 2024-12-28 NOTE — CONSULT NOTE ADULT - SUBJECTIVE AND OBJECTIVE BOX
Patient is a 89y old  Female who presents with a chief complaint of chills    HPI:  89-year-old female with h/o recurrent urinary tract infections, atrial fibrillation s/p cardioversion, with watchman (anticoagulation previously discontinued given hemorrhagic bursitis, renal hematoma 2019), hemophilia C, hypertension, hyperlipidemia, GERD, esophageal stricture/achalasia status post endoscopic POEM 2017, osteoarthritis bilateral knees and shoulders, degenerative disc disease, spinal stenosis, sciatica was admitted on 12/27 for feeling unwell with chills at home x one day.  Patient reports that she does not have dysuria at this time but that she feels like she has a urinary tract infection.  Last UTI treated approximately 3 weeks prior, notes that she has been having poor p.o. intake over the last 1-2 days. In ER she was noted febrile and received ceftriaxone.     PMH: as above  PSH: as above  Meds: per reconciliation sheet, noted below  MEDICATIONS  (STANDING):  ascorbic acid 500 milliGRAM(s) Oral daily  aspirin enteric coated 81 milliGRAM(s) Oral daily  atorvastatin 10 milliGRAM(s) Oral at bedtime  bisacodyl 5 milliGRAM(s) Oral at bedtime  cefTRIAXone Injectable. 2000 milliGRAM(s) IV Push every 24 hours  cholecalciferol 400 Unit(s) Oral daily  ferrous    sulfate 325 milliGRAM(s) Oral daily  fluticasone propionate 50 MICROgram(s)/spray Nasal Spray 1 Spray(s) Both Nostrils <User Schedule>  lidocaine   4% Patch 1 Patch Transdermal every 24 hours  pantoprazole    Tablet 40 milliGRAM(s) Oral two times a day  polyethylene glycol 3350 17 Gram(s) Oral two times a day  senna 2 Tablet(s) Oral at bedtime  sotalol. 80 milliGRAM(s) Oral every 12 hours    MEDICATIONS  (PRN):  acetaminophen     Tablet .. 650 milliGRAM(s) Oral every 6 hours PRN Mild Pain (1 - 3)  oxycodone    5 mG/acetaminophen 325 mG 0.5 Tablet(s) Oral every 6 hours PRN Severe Pain (7 - 10)    Allergies    Levaquin (Unknown)  Bactrim (Unknown)    Intolerances      Social: no smoking, no alcohol, no illegal drugs; no recent travel, no exposure to TB  FAMILY HISTORY:  No pertinent family history in first degree relatives      no history of premature cardiovascular disease in first degree relatives    ROS: the patient denies HA, no seizures, no dizziness, no sore throat, no nasal congestion, no blurry vision, no CP, no palpitations, no SOB, no cough, no abdominal pain, no diarrhea, no N/V, has dysuria, no leg pain, no claudication, no rash, no joint aches, no rectal pain or bleeding, no night sweats  All other systems reviewed and are negative    Vital Signs Last 24 Hrs  T(C): 36.9 (28 Dec 2024 07:15), Max: 37.9 (27 Dec 2024 15:31)  T(F): 98.4 (28 Dec 2024 07:15), Max: 100.2 (27 Dec 2024 15:31)  HR: 62 (28 Dec 2024 07:15) (61 - 65)  BP: 127/56 (28 Dec 2024 07:15) (98/48 - 133/45)  BP(mean): 57 (27 Dec 2024 22:20) (57 - 65)  RR: 17 (28 Dec 2024 07:15) (17 - 18)  SpO2: 94% (28 Dec 2024 07:15) (93% - 98%)    Parameters below as of 28 Dec 2024 07:15  Patient On (Oxygen Delivery Method): room air    PE:    Constitutional:  No acute distress  HEENT: NC/AT, EOMI, PERRLA, conjunctivae clear; ears and nose atraumatic; pharynx benign  Neck: supple; thyroid not palpable  Back: no tenderness  Respiratory: respiratory effort normal; clear to auscultation  Cardiovascular: S1S2 regular, no murmurs  Abdomen: soft, not tender, not distended, positive BS; no liver or spleen organomegaly  Genitourinary: no suprapubic tenderness  Lymphatic: no LN palpable  Musculoskeletal: no muscle tenderness, no joint swelling or tenderness  Extremities: no pedal edema  Neurological/ Psychiatric: AxOx3, judgement and insight normal; moving all extremities  Skin: no rashes; no palpable lesions    Labs: all available labs reviewed                        13.1   17.61 )-----------( 172      ( 26 Dec 2024 22:17 )             39.1     12-27    135  |  102  |  27[H]  ----------------------------<  145[H]  4.0   |  30  |  1.34[H]    Ca    9.0      27 Dec 2024 10:34  Phos  2.7     12-27  Mg     2.5     12-27    TPro  6.8  /  Alb  3.3  /  TBili  1.4[H]  /  DBili  x   /  AST  18  /  ALT  13  /  AlkPhos  78  12-26     LIVER FUNCTIONS - ( 26 Dec 2024 22:17 )  Alb: 3.3 g/dL / Pro: 6.8 gm/dL / ALK PHOS: 78 U/L / ALT: 13 U/L / AST: 18 U/L / GGT: x           Culture - Urine (collected 26 Dec 2024 23:42)  Source: Clean Catch None  Preliminary Report (28 Dec 2024 06:33):    >100,000 CFU/ml Escherichia coli    Urinalysis with Rflx Culture (collected 26 Dec 2024 23:42)    Culture - Blood (collected 26 Dec 2024 22:17)  Source: .Blood BLOOD  Preliminary Report (28 Dec 2024 06:01):    No growth at 24 hours    Culture - Blood (collected 26 Dec 2024 21:42)  Source: .Blood BLOOD  Gram Stain (27 Dec 2024 16:37):    Growth in aerobic bottle: Gram Negative Rods  Preliminary Report (27 Dec 2024 16:38):    Growth in aerobic bottle: Gram Negative Rods    Direct identification is available within approximately 3-5    hours either by Blood Panel Multiplexed PCR or Direct    MALDI-TOF. Details: [email or web address]  Organism: Blood Culture PCR (27 Dec 2024 18:10)  Organism: Blood Culture PCR (27 Dec 2024 18:10)      Method Type: PCR      -  Escherichia coli: Detec    Culture - Blood (collected 26 Dec 2024 21:32)  Source: .Blood BLOOD  Preliminary Report (28 Dec 2024 06:01):    No growth at 24 hours    Radiology: all available radiological tests reviewed    < from: Xray Chest 1 View-PORTABLE IMMEDIATE (12.26.24 @ 21:45) >  No acute pulmonary process  < end of copied text >      Advanced directives addressed: full resuscitation

## 2024-12-28 NOTE — CONSULT NOTE ADULT - ASSESSMENT
89-year-old female with h/o recurrent urinary tract infections, atrial fibrillation s/p cardioversion, with watchman (anticoagulation previously discontinued given hemorrhagic bursitis, renal hematoma 2019), hemophilia C, hypertension, hyperlipidemia, GERD, esophageal stricture/achalasia s/p endoscopic POEM 2017, osteoarthritis bilateral knees and shoulders, degenerative disc disease, spinal stenosis, sciatica was admitted on 12/27 for feeling unwell with chills at home x one day.  Patient reports that she does not have dysuria at this time but that she feels like she has a urinary tract infection. Last UTI treated approximately 3 weeks prior, notes that she has been having poor p.o. intake over the last 1-2 days. In ER she was noted febrile and received ceftriaxone.    #Sepsis with E.coli  #Pyuria. Probable UTI  #Urinary bladder disfunction with recurrent UTIs  #Allergy to Levaquin and Bactrim  -cultures reviewed  -start ceftriaxone 2 gm IV qd  -reason for abx use and side effects reviewed with patient; monitor BMP   -old chart reviewed to assess prior cultures  -monitor temps  -f/u CBC  -supportive care  2. Other issues:   -care per medicine    d/w Dr. Alexis    Clinical team may change from intravenous to oral antibiotics when the following criteria are met:   1. Patient is clinically improving/stable       a)	Improved signs and symptoms of infection from initial presentation       b)	Afebrile for 24 hours       c)	Leukocytosis trending towards normal range   2. Patient is tolerating oral intake   3. Initial/repeat blood cultures are negative     When above criteria met may change iv antibiotics to an oral agent  Cannot advise changing to oral antibiotic therapy until culture sensitivity is available.

## 2024-12-29 PROBLEM — I50.9 HEART FAILURE, UNSPECIFIED: Chronic | Status: INACTIVE | Noted: 2024-12-26 | Resolved: 2024-12-29

## 2024-12-29 PROBLEM — I48.91 UNSPECIFIED ATRIAL FIBRILLATION: Chronic | Status: INACTIVE | Noted: 2024-12-26 | Resolved: 2024-12-29

## 2024-12-29 LAB
-  AMPICILLIN/SULBACTAM: SIGNIFICANT CHANGE UP
-  AMPICILLIN: SIGNIFICANT CHANGE UP
-  AZTREONAM: SIGNIFICANT CHANGE UP
-  CEFAZOLIN: SIGNIFICANT CHANGE UP
-  CEFEPIME: SIGNIFICANT CHANGE UP
-  CEFOXITIN: SIGNIFICANT CHANGE UP
-  CEFTRIAXONE: SIGNIFICANT CHANGE UP
-  CIPROFLOXACIN: SIGNIFICANT CHANGE UP
-  ERTAPENEM: SIGNIFICANT CHANGE UP
-  GENTAMICIN: SIGNIFICANT CHANGE UP
-  IMIPENEM: SIGNIFICANT CHANGE UP
-  LEVOFLOXACIN: SIGNIFICANT CHANGE UP
-  MEROPENEM: SIGNIFICANT CHANGE UP
-  PIPERACILLIN/TAZOBACTAM: SIGNIFICANT CHANGE UP
-  TOBRAMYCIN: SIGNIFICANT CHANGE UP
-  TRIMETHOPRIM/SULFAMETHOXAZOLE: SIGNIFICANT CHANGE UP
ANION GAP SERPL CALC-SCNC: 3 MMOL/L — LOW (ref 5–17)
BUN SERPL-MCNC: 26 MG/DL — HIGH (ref 7–23)
CALCIUM SERPL-MCNC: 8.5 MG/DL — SIGNIFICANT CHANGE UP (ref 8.5–10.1)
CHLORIDE SERPL-SCNC: 101 MMOL/L — SIGNIFICANT CHANGE UP (ref 96–108)
CO2 SERPL-SCNC: 30 MMOL/L — SIGNIFICANT CHANGE UP (ref 22–31)
CREAT SERPL-MCNC: 1.25 MG/DL — SIGNIFICANT CHANGE UP (ref 0.5–1.3)
EGFR: 42 ML/MIN/1.73M2 — LOW
GLUCOSE SERPL-MCNC: 113 MG/DL — HIGH (ref 70–99)
HCT VFR BLD CALC: 32.4 % — LOW (ref 34.5–45)
HGB BLD-MCNC: 10.8 G/DL — LOW (ref 11.5–15.5)
MCHC RBC-ENTMCNC: 32.8 PG — SIGNIFICANT CHANGE UP (ref 27–34)
MCHC RBC-ENTMCNC: 33.3 G/DL — SIGNIFICANT CHANGE UP (ref 32–36)
MCV RBC AUTO: 98.5 FL — SIGNIFICANT CHANGE UP (ref 80–100)
METHOD TYPE: SIGNIFICANT CHANGE UP
PLATELET # BLD AUTO: 154 K/UL — SIGNIFICANT CHANGE UP (ref 150–400)
POTASSIUM SERPL-MCNC: 4.1 MMOL/L — SIGNIFICANT CHANGE UP (ref 3.5–5.3)
POTASSIUM SERPL-SCNC: 4.1 MMOL/L — SIGNIFICANT CHANGE UP (ref 3.5–5.3)
RBC # BLD: 3.29 M/UL — LOW (ref 3.8–5.2)
RBC # FLD: 13.9 % — SIGNIFICANT CHANGE UP (ref 10.3–14.5)
SODIUM SERPL-SCNC: 134 MMOL/L — LOW (ref 135–145)
WBC # BLD: 11 K/UL — HIGH (ref 3.8–10.5)
WBC # FLD AUTO: 11 K/UL — HIGH (ref 3.8–10.5)

## 2024-12-29 PROCEDURE — 99233 SBSQ HOSP IP/OBS HIGH 50: CPT

## 2024-12-29 RX ORDER — FUROSEMIDE 20 MG
40 TABLET ORAL DAILY
Refills: 0 | Status: DISCONTINUED | OUTPATIENT
Start: 2024-12-29 | End: 2024-12-30

## 2024-12-29 RX ADMIN — Medication 500 MILLIGRAM(S): at 10:26

## 2024-12-29 RX ADMIN — CEFTRIAXONE SODIUM 2000 MILLIGRAM(S): 1 INJECTION, POWDER, FOR SOLUTION INTRAMUSCULAR; INTRAVENOUS at 10:27

## 2024-12-29 RX ADMIN — PANTOPRAZOLE 40 MILLIGRAM(S): 40 TABLET, DELAYED RELEASE ORAL at 10:26

## 2024-12-29 RX ADMIN — LIDOCAINE 1 PATCH: 50 OINTMENT TOPICAL at 19:41

## 2024-12-29 RX ADMIN — Medication 325 MILLIGRAM(S): at 10:26

## 2024-12-29 RX ADMIN — FLUTICASONE PROPIONATE 1 SPRAY(S): 50 SPRAY, METERED NASAL at 10:25

## 2024-12-29 RX ADMIN — LIDOCAINE 1 PATCH: 50 OINTMENT TOPICAL at 10:25

## 2024-12-29 RX ADMIN — SENNOSIDES 2 TABLET(S): 8.6 TABLET, FILM COATED ORAL at 23:00

## 2024-12-29 RX ADMIN — LIDOCAINE 1 PATCH: 50 OINTMENT TOPICAL at 23:24

## 2024-12-29 RX ADMIN — Medication 400 UNIT(S): at 10:26

## 2024-12-29 RX ADMIN — PANTOPRAZOLE 40 MILLIGRAM(S): 40 TABLET, DELAYED RELEASE ORAL at 22:58

## 2024-12-29 RX ADMIN — Medication 81 MILLIGRAM(S): at 10:25

## 2024-12-29 RX ADMIN — SOTALOL HYDROCHLORIDE 80 MILLIGRAM(S): 160 TABLET ORAL at 22:57

## 2024-12-29 RX ADMIN — SOTALOL HYDROCHLORIDE 80 MILLIGRAM(S): 160 TABLET ORAL at 10:26

## 2024-12-29 RX ADMIN — Medication 17 GRAM(S): at 22:57

## 2024-12-29 RX ADMIN — Medication 40 MILLIGRAM(S): at 10:25

## 2024-12-29 RX ADMIN — Medication 5 MILLIGRAM(S): at 22:59

## 2024-12-29 RX ADMIN — ATORVASTATIN CALCIUM 10 MILLIGRAM(S): 40 TABLET, FILM COATED ORAL at 23:00

## 2024-12-29 NOTE — PHYSICAL THERAPY INITIAL EVALUATION ADULT - RANGE OF MOTION EXAMINATION, REHAB EVAL
b/l shoulder elevation 0-140 deg/bilateral upper extremity ROM was WFL (within functional limits)/bilateral lower extremity ROM was WFL (within functional limits)

## 2024-12-29 NOTE — PHYSICAL THERAPY INITIAL EVALUATION ADULT - PERTINENT HX OF CURRENT PROBLEM, REHAB EVAL
89-year-old female past medical history notable for recurrent urinary tract infections, atrial fibrillation status post cardioversion now with watchman (anticoagulation previously discontinued given hemorrhagic bursitis, renal hematoma 2019), hemophilia C, hypertension, hyperlipidemia, GERD, esophageal stricture/achalasia status post endoscopic POEM 2017, osteoarthritis bilateral knees and shoulders, degenerative disc disease, was spinal stenosis, sciatica, who presents with 1 day history of feeling unwell with chills at home.

## 2024-12-29 NOTE — PROGRESS NOTE ADULT - TIME BILLING
Time spent  coordinating the patient's care. This includes reviewing documentation pertinent to this admission, results and imaging. Further tests, medications, and procedures have been ordered as indicated. Laboratory results and the plan of care were communicated to the patient. Discussed care plan with consultants including . Supporting documentation was completed and added to the patient's chart.
Time spent  coordinating the patient's care. This includes reviewing documentation pertinent to this admission, results and imaging. Further tests, medications, and procedures have been ordered as indicated. Laboratory results and the plan of care were communicated to the patient. Discussed care plan with consultants including . Supporting documentation was completed and added to the patient's chart.

## 2024-12-30 ENCOUNTER — TRANSCRIPTION ENCOUNTER (OUTPATIENT)
Age: 89
End: 2024-12-30

## 2024-12-30 VITALS
DIASTOLIC BLOOD PRESSURE: 60 MMHG | SYSTOLIC BLOOD PRESSURE: 109 MMHG | OXYGEN SATURATION: 93 % | HEART RATE: 65 BPM | TEMPERATURE: 99 F | RESPIRATION RATE: 18 BRPM

## 2024-12-30 LAB
ANION GAP SERPL CALC-SCNC: 5 MMOL/L — SIGNIFICANT CHANGE UP (ref 5–17)
BASOPHILS # BLD AUTO: 0.07 K/UL — SIGNIFICANT CHANGE UP (ref 0–0.2)
BASOPHILS NFR BLD AUTO: 0.7 % — SIGNIFICANT CHANGE UP (ref 0–2)
BUN SERPL-MCNC: 27 MG/DL — HIGH (ref 7–23)
CALCIUM SERPL-MCNC: 8.8 MG/DL — SIGNIFICANT CHANGE UP (ref 8.5–10.1)
CHLORIDE SERPL-SCNC: 101 MMOL/L — SIGNIFICANT CHANGE UP (ref 96–108)
CO2 SERPL-SCNC: 29 MMOL/L — SIGNIFICANT CHANGE UP (ref 22–31)
CREAT SERPL-MCNC: 1.4 MG/DL — HIGH (ref 0.5–1.3)
EGFR: 36 ML/MIN/1.73M2 — LOW
EOSINOPHIL # BLD AUTO: 0.34 K/UL — SIGNIFICANT CHANGE UP (ref 0–0.5)
EOSINOPHIL NFR BLD AUTO: 3.3 % — SIGNIFICANT CHANGE UP (ref 0–6)
GLUCOSE SERPL-MCNC: 158 MG/DL — HIGH (ref 70–99)
HCT VFR BLD CALC: 33.1 % — LOW (ref 34.5–45)
HGB BLD-MCNC: 11.1 G/DL — LOW (ref 11.5–15.5)
IMM GRANULOCYTES NFR BLD AUTO: 0.9 % — SIGNIFICANT CHANGE UP (ref 0–0.9)
LYMPHOCYTES # BLD AUTO: 0.73 K/UL — LOW (ref 1–3.3)
LYMPHOCYTES # BLD AUTO: 7.2 % — LOW (ref 13–44)
MCHC RBC-ENTMCNC: 32.8 PG — SIGNIFICANT CHANGE UP (ref 27–34)
MCHC RBC-ENTMCNC: 33.5 G/DL — SIGNIFICANT CHANGE UP (ref 32–36)
MCV RBC AUTO: 97.9 FL — SIGNIFICANT CHANGE UP (ref 80–100)
MONOCYTES # BLD AUTO: 1.03 K/UL — HIGH (ref 0–0.9)
MONOCYTES NFR BLD AUTO: 10.1 % — SIGNIFICANT CHANGE UP (ref 2–14)
NEUTROPHILS # BLD AUTO: 7.91 K/UL — HIGH (ref 1.8–7.4)
NEUTROPHILS NFR BLD AUTO: 77.8 % — HIGH (ref 43–77)
PLATELET # BLD AUTO: 160 K/UL — SIGNIFICANT CHANGE UP (ref 150–400)
POTASSIUM SERPL-MCNC: 3.6 MMOL/L — SIGNIFICANT CHANGE UP (ref 3.5–5.3)
POTASSIUM SERPL-SCNC: 3.6 MMOL/L — SIGNIFICANT CHANGE UP (ref 3.5–5.3)
RBC # BLD: 3.38 M/UL — LOW (ref 3.8–5.2)
RBC # FLD: 13.7 % — SIGNIFICANT CHANGE UP (ref 10.3–14.5)
SODIUM SERPL-SCNC: 135 MMOL/L — SIGNIFICANT CHANGE UP (ref 135–145)
WBC # BLD: 10.17 K/UL — SIGNIFICANT CHANGE UP (ref 3.8–10.5)
WBC # FLD AUTO: 10.17 K/UL — SIGNIFICANT CHANGE UP (ref 3.8–10.5)

## 2024-12-30 PROCEDURE — 99239 HOSP IP/OBS DSCHRG MGMT >30: CPT

## 2024-12-30 PROCEDURE — 76700 US EXAM ABDOM COMPLETE: CPT | Mod: 26

## 2024-12-30 RX ORDER — IPRATROPIUM BROMIDE 0.2 MG/ML
2 SOLUTION, NON-ORAL INHALATION
Refills: 0 | DISCHARGE

## 2024-12-30 RX ORDER — ONDANSETRON 4 MG/1
4 TABLET ORAL ONCE
Refills: 0 | Status: DISCONTINUED | OUTPATIENT
Start: 2024-12-30 | End: 2024-12-30

## 2024-12-30 RX ORDER — CEFUROXIME AXETIL 250 MG
1 TABLET ORAL
Qty: 22 | Refills: 0
Start: 2024-12-30 | End: 2025-01-09

## 2024-12-30 RX ORDER — FUROSEMIDE 20 MG
1 TABLET ORAL
Qty: 0 | Refills: 0 | DISCHARGE
Start: 2024-12-30

## 2024-12-30 RX ORDER — CHOLECALCIFEROL (VITAMIN D3) 10 MCG
1 TABLET ORAL
Refills: 0 | DISCHARGE

## 2024-12-30 RX ORDER — FAMOTIDINE 20 MG/1
1 TABLET, FILM COATED ORAL
Refills: 0 | DISCHARGE

## 2024-12-30 RX ORDER — POTASSIUM CHLORIDE 600 MG/1
1 TABLET, FILM COATED, EXTENDED RELEASE ORAL
Refills: 0 | DISCHARGE

## 2024-12-30 RX ORDER — FUROSEMIDE 20 MG
1 TABLET ORAL
Refills: 0 | DISCHARGE

## 2024-12-30 RX ORDER — ASCORBIC ACID 1000 MG
1 TABLET ORAL
Refills: 0 | DISCHARGE

## 2024-12-30 RX ORDER — DICLOFENAC SODIUM 1 %
4 GEL (GRAM) TOPICAL
Refills: 0 | DISCHARGE

## 2024-12-30 RX ORDER — LIDOCAINE 50 MG/G
0 OINTMENT TOPICAL
Refills: 0 | DISCHARGE

## 2024-12-30 RX ORDER — FLUTICASONE PROPIONATE 50 UG/1
2 SPRAY, METERED NASAL
Refills: 0 | DISCHARGE

## 2024-12-30 RX ORDER — BACILLUS COAGULANS/INULIN 1B-250 MG
2 CAPSULE ORAL
Refills: 0 | DISCHARGE

## 2024-12-30 RX ORDER — SENNOSIDES 8.6 MG/1
1 TABLET, FILM COATED ORAL
Refills: 0 | DISCHARGE

## 2024-12-30 RX ORDER — PANTOPRAZOLE 40 MG/1
1 TABLET, DELAYED RELEASE ORAL
Refills: 0 | DISCHARGE

## 2024-12-30 RX ORDER — CETIRIZINE HYDROCHLORIDE 5 MG/5ML
1 SYRUP ORAL
Refills: 0 | DISCHARGE

## 2024-12-30 RX ORDER — ESTROGENS, CONJUGATED 0.625 MG/G
1 CREAM (GRAM) VAGINAL
Refills: 0 | DISCHARGE

## 2024-12-30 RX ORDER — ASPIRIN 81 MG
1 TABLET, DELAYED RELEASE (ENTERIC COATED) ORAL
Refills: 0 | DISCHARGE

## 2024-12-30 RX ADMIN — Medication 400 UNIT(S): at 10:45

## 2024-12-30 RX ADMIN — FLUTICASONE PROPIONATE 1 SPRAY(S): 50 SPRAY, METERED NASAL at 10:43

## 2024-12-30 RX ADMIN — Medication 40 MILLIGRAM(S): at 10:44

## 2024-12-30 RX ADMIN — CEFTRIAXONE SODIUM 2000 MILLIGRAM(S): 1 INJECTION, POWDER, FOR SOLUTION INTRAMUSCULAR; INTRAVENOUS at 10:43

## 2024-12-30 RX ADMIN — SOTALOL HYDROCHLORIDE 80 MILLIGRAM(S): 160 TABLET ORAL at 10:45

## 2024-12-30 RX ADMIN — Medication 325 MILLIGRAM(S): at 10:44

## 2024-12-30 RX ADMIN — PANTOPRAZOLE 40 MILLIGRAM(S): 40 TABLET, DELAYED RELEASE ORAL at 10:44

## 2024-12-30 RX ADMIN — Medication 81 MILLIGRAM(S): at 10:44

## 2024-12-30 RX ADMIN — LIDOCAINE 1 PATCH: 50 OINTMENT TOPICAL at 10:45

## 2024-12-30 RX ADMIN — Medication 500 MILLIGRAM(S): at 10:45

## 2024-12-30 NOTE — DISCHARGE NOTE PROVIDER - NSDCFUSCHEDAPPT_GEN_ALL_CORE_FT
Iveth Raphael  [facility] Physician formerly [facility]  UROLOGY [address] [facility]  Scheduled Appointment: 01/22/2025

## 2024-12-30 NOTE — DISCHARGE NOTE PROVIDER - NSDCMRMEDTOKEN_GEN_ALL_CORE_FT
acetaminophen: **takes with percocet as needed for pain  acetaminophen: **takes with percocet as needed for pain  ascorbic acid: 1 tab(s) orally once a day  Aspir 81 oral delayed release tablet: 1 tab(s) orally once a day  cefuroxime 500 mg oral tablet: 1 tab(s) orally 2 times a day start 12/30/24 at night  cetirizine: 1 tab(s) orally once a day  cholecalciferol: 1 tab(s) orally once a day  Colace 100 mg oral capsule: 1 cap(s) orally once a day  famotidine 40 mg oral tablet: 1 tab(s) orally once a day as needed for  heartburn  fluticasone nasal: 2 puff(s) intranasally once a day **50 mcg/actuation bottle, 2 puffs each nostril daily  furosemide 40 mg oral tablet: 1 tab(s) orally once a day  ipratropium 17 mcg/inh inhalation aerosol: 2 puff(s) inhaled every 4 hours  iron 25 mg tab: 1 tab QD  lidocaine patch 5%: apply 1 patch to intact skin remove after 12 hrs externally once a day  lovastatin: 40 milligram(s) orally once a day  methenamine hippurate 1 g oral tablet: 1 tab(s) orally once a day **prescribed 12/24/24, patient says she started this medication at home daily in evening  pantoprazole 40 mg oral delayed release tablet: 1 tab(s) orally once a day before breakfast  Percocet 5/325: 1 tab(s) orally 2 times a day as needed for  severe pain **patient taking 3-4 1/2 tablets daily**  polyethylene glycol 3350 oral powder for reconstitution: 17 gram(s) orally once a day (at bedtime), As needed, Constipation  Potassium Chloride (Eqv-Klor-Con 10) 10 mEq oral tablet, extended release: 1 tab(s) orally 2 times a day **prescribed 1 tab BID on 12/23 by dr. castillo, however patient&#x27;s med list and patient endorses she thinks she takes 10 mEq 4 times a week then 3 times a week she takes 20 mEq.**  Premarin 0.625 mg/g vaginal cream with applicator: 1 gram(s) intravaginally 3 times a week **PEA SIZE AMOUNT 3-4 TIMES A WEEK TO VAGINAL OPENING  Probiotic Formula (Bacillus Coagulans) oral capsule: 2 cap(s) orally once a day  senna: 1 tab(s) orally once a day  sotalol 80 mg oral tablet: 80 milligram(s) orally 2 times a day  Voltaren 1% topical gel: Apply topically to affected area once a day

## 2024-12-30 NOTE — DISCHARGE NOTE PROVIDER - NSDCFUADDAPPT_GEN_ALL_CORE_FT
APPTS ARE READY TO BE MADE: [X] YES    Best Family or Patient Contact (if needed):    Additional Information about above appointments (if needed):    1: Dr. Romero (cardiology)  2: Dr. Márquez (PCP)  3: Dr. Jenkins (urogyn)

## 2024-12-30 NOTE — DISCHARGE NOTE PROVIDER - NSDCCPCAREPLAN_GEN_ALL_CORE_FT
PRINCIPAL DISCHARGE DIAGNOSIS  Diagnosis: Acute UTI  Assessment and Plan of Treatment: You are found to have a urinary tract infection with E. coli. We found the same bug in your blood and you likely developed this from a urinary tract infection.  You are treated with IV antibiotic while admitted and improved.  Please continue oral antibiotics (Ceftin 500 mg by mouth every 12 hours) which was sent to your pharmacy.  If you develop any worsening fevers, chest pain, lightheadedness, or blood in the urine please come back to your nearest emergency department for further evaluation      SECONDARY DISCHARGE DIAGNOSES  Diagnosis: Hypertension  Assessment and Plan of Treatment: continue with home meds    Diagnosis: Atrial fibrillation  Assessment and Plan of Treatment: continue with home meds    Diagnosis: Osteoarthritis  Assessment and Plan of Treatment: continue with home meds    Diagnosis: Congestive heart failure (CHF)  Assessment and Plan of Treatment: Continue with Lasix 40 mg once daily and daily potassium repletion (previously sent by your cardiologist).  Please follow-up closely with your cardiologist.    Diagnosis: Esophageal achalasia  Assessment and Plan of Treatment: continue with home meds    Diagnosis: Chronic GERD  Assessment and Plan of Treatment: continue with home meds    Diagnosis: Allergies  Assessment and Plan of Treatment: continue with home meds    Diagnosis: Chronic UTI (urinary tract infection)  Assessment and Plan of Treatment: continue with home meds. Follow up with Dr. Remy (urogynecologist) - his info will be provided in your papers    Diagnosis: Reactive airway disease  Assessment and Plan of Treatment: continue with home meds    Diagnosis: Iron deficiency anemia  Assessment and Plan of Treatment: continue with home meds    Diagnosis: Hyperlipidemia  Assessment and Plan of Treatment: continue with home meds    Diagnosis: Low serum vitamin C  Assessment and Plan of Treatment: continue with home meds    Diagnosis: Low serum vitamin D  Assessment and Plan of Treatment: continue with home meds    Diagnosis: CAD (coronary artery disease)  Assessment and Plan of Treatment: continue with home meds    Diagnosis: Hemophilia C  Assessment and Plan of Treatment: continue with home meds    Diagnosis: E coli bacteremia  Assessment and Plan of Treatment: continue antibiotics as above    Diagnosis: Constipation  Assessment and Plan of Treatment: continue with home meds

## 2024-12-30 NOTE — DISCHARGE NOTE PROVIDER - CARE PROVIDER_API CALL
Trent Márquez  [address] [address]  Phone: [phone]  Fax: [phone]  Established Patient  Follow Up Time: 2 weeks    Denisse Romero  Cardiovascular Disease  [address] [place] [address]  Phone: [phone]  Fax: [phone]  Established Patient  Follow Up Time: 2 weeks    Mustapha Jenkins  Urogyn and Reconst Pelvic Surg  [address] [place] [id number]  Phone: [phone]  Fax: [phone]  Established Patient  Follow Up Time: 2 weeks

## 2024-12-30 NOTE — DISCHARGE NOTE PROVIDER - ATTENDING DISCHARGE PHYSICAL EXAMINATION:
Constitutional:  No acute distress  HEENT: NC/AT, EOMI, PERRLA, conjunctivae clear; ears and nose atraumatic; pharynx benign  Neck: supple; thyroid not palpable  Back: no tenderness  Respiratory: respiratory effort normal; clear to auscultation  Cardiovascular: S1S2 regular, no murmurs  Abdomen: soft, not tender, not distended, positive BS; no liver or spleen organomegaly  Genitourinary: no suprapubic tenderness  Lymphatic: no LN palpable  Musculoskeletal: no muscle tenderness, no joint swelling or tenderness  Extremities: no pedal edema  Neurological/ Psychiatric: AxOx3, judgement and insight normal; moving all extremities  Skin: no rashes; no palpable lesions

## 2024-12-30 NOTE — DISCHARGE NOTE PROVIDER - CARE PROVIDERS DIRECT ADDRESSES
[email or web address]

## 2024-12-30 NOTE — PROGRESS NOTE ADULT - SUBJECTIVE AND OBJECTIVE BOX
HOSPITALIST ATTENDING PROGRESS NOTE    Chart and meds reviewed.  Patient seen and examined.    Subjective:  Overnight patient with some wheezing, received one-time DuoNeb treatment.  This morning states that she still feels somewhat unwell, denies chest pain, shortness of breath with exertion at this time.    All other systems reviewed and found to be negative with the exception of what has been described above.    MEDICATIONS  (STANDING):  ascorbic acid 500 milliGRAM(s) Oral daily  aspirin enteric coated 81 milliGRAM(s) Oral daily  atorvastatin 10 milliGRAM(s) Oral at bedtime  bisacodyl 5 milliGRAM(s) Oral at bedtime  cefTRIAXone Injectable. 2000 milliGRAM(s) IV Push every 24 hours  cholecalciferol 400 Unit(s) Oral daily  ferrous    sulfate 325 milliGRAM(s) Oral daily  fluticasone propionate 50 MICROgram(s)/spray Nasal Spray 1 Spray(s) Both Nostrils <User Schedule>  furosemide    Tablet 40 milliGRAM(s) Oral daily  lidocaine   4% Patch 1 Patch Transdermal every 24 hours  pantoprazole    Tablet 40 milliGRAM(s) Oral two times a day  polyethylene glycol 3350 17 Gram(s) Oral two times a day  senna 2 Tablet(s) Oral at bedtime  sotalol. 80 milliGRAM(s) Oral every 12 hours    MEDICATIONS  (PRN):  acetaminophen     Tablet .. 650 milliGRAM(s) Oral every 6 hours PRN Mild Pain (1 - 3)  oxycodone    5 mG/acetaminophen 325 mG 0.5 Tablet(s) Oral every 6 hours PRN Severe Pain (7 - 10)      VITALS:  T(F): 98.2 (12-29-24 @ 15:56), Max: 98.2 (12-28-24 @ 21:53)  HR: 110 (12-29-24 @ 15:56) (60 - 110)  BP: 100/72 (12-29-24 @ 15:56) (100/72 - 143/65)  RR: 18 (12-29-24 @ 15:56) (17 - 18)  SpO2: 96% (12-29-24 @ 15:56) (93% - 96%)  Wt(kg): --    I&O's Summary    28 Dec 2024 07:01  -  29 Dec 2024 07:00  --------------------------------------------------------  IN: 0 mL / OUT: 1250 mL / NET: -1250 mL    29 Dec 2024 07:01  -  29 Dec 2024 17:46  --------------------------------------------------------  IN: 0 mL / OUT: 850 mL / NET: -850 mL        CAPILLARY BLOOD GLUCOSE          PHYSICAL EXAM:  Gen: NAD, comfortable, AA&Ox4  HEENT: head atrumatic and normocephalic, EOMI  CVS: +s1, s2, regular rate and rhythm, no murmurs, rubs or gallops  Pulmonary: normal respiratory effort, clear to auscultation b/l, no wheezes/crackles/rhonchi  Abdomen: soft, non-tender, non-distended, +bowel sounds in all 4 quadrants, no masses noted, no guarding or rigidity   Back: no CVA tenderness   Extremities: no pedal edema, pedal pulses palpable  Skin: nl warm and dry, no wounds   Neuro: answering questions appropriately, face symmetric      LABS:                            10.8   11.00 )-----------( 154      ( 29 Dec 2024 06:28 )             32.4     12-29    134[L]  |  101  |  26[H]  ----------------------------<  113[H]  4.1   |  30  |  1.25    Ca    8.5      29 Dec 2024 06:28  Phos  2.7     12-28  Mg     2.6     12-28              Urinalysis Basic - ( 29 Dec 2024 06:28 )    Color: x / Appearance: x / SG: x / pH: x  Gluc: 113 mg/dL / Ketone: x  / Bili: x / Urobili: x   Blood: x / Protein: x / Nitrite: x   Leuk Esterase: x / RBC: x / WBC x   Sq Epi: x / Non Sq Epi: x / Bacteria: x              CULTURES:      Additional results/Imaging, I have personally reviewed:    Telemetry, personally reviewed:
Date of service: 12-30-24 @ 10:21    Lying in bed in NAD  Weak looking  Dysuria is improving  No fever    ROS: no fever or chills; denies dizziness, no HA, no SOB or cough, no abdominal pain, no diarrhea or constipation; no legs pain, no rashes    MEDICATIONS  (STANDING):  ascorbic acid 500 milliGRAM(s) Oral daily  aspirin enteric coated 81 milliGRAM(s) Oral daily  atorvastatin 10 milliGRAM(s) Oral at bedtime  bisacodyl 5 milliGRAM(s) Oral at bedtime  cefTRIAXone Injectable. 2000 milliGRAM(s) IV Push every 24 hours  cholecalciferol 400 Unit(s) Oral daily  ferrous    sulfate 325 milliGRAM(s) Oral daily  fluticasone propionate 50 MICROgram(s)/spray Nasal Spray 1 Spray(s) Both Nostrils <User Schedule>  furosemide    Tablet 40 milliGRAM(s) Oral daily  lidocaine   4% Patch 1 Patch Transdermal every 24 hours  pantoprazole    Tablet 40 milliGRAM(s) Oral two times a day  polyethylene glycol 3350 17 Gram(s) Oral two times a day  senna 2 Tablet(s) Oral at bedtime  sotalol. 80 milliGRAM(s) Oral every 12 hours    Vital Signs Last 24 Hrs  T(C): 37 (30 Dec 2024 07:45), Max: 37 (30 Dec 2024 07:45)  T(F): 98.6 (30 Dec 2024 07:45), Max: 98.6 (30 Dec 2024 07:45)  HR: 65 (30 Dec 2024 07:45) (60 - 110)  BP: 109/60 (30 Dec 2024 07:45) (100/72 - 109/60)  BP(mean): --  RR: 18 (30 Dec 2024 07:45) (18 - 18)  SpO2: 93% (30 Dec 2024 07:45) (93% - 96%)    Parameters below as of 30 Dec 2024 07:45  Patient On (Oxygen Delivery Method): room air     Physical exam:    Constitutional:  No acute distress  HEENT: NC/AT, EOMI, PERRLA, conjunctivae clear; ears and nose atraumatic; pharynx benign  Neck: supple; thyroid not palpable  Back: no tenderness  Respiratory: respiratory effort normal; clear to auscultation  Cardiovascular: S1S2 regular, no murmurs  Abdomen: soft, not tender, not distended, positive BS; no liver or spleen organomegaly  Genitourinary: no suprapubic tenderness  Lymphatic: no LN palpable  Musculoskeletal: no muscle tenderness, no joint swelling or tenderness  Extremities: no pedal edema  Neurological/ Psychiatric: AxOx3, judgement and insight normal; moving all extremities  Skin: no rashes; no palpable lesions    Labs: reviewed                        10.8   11.00 )-----------( 154      ( 29 Dec 2024 06:28 )             32.4     12-29    134[L]  |  101  |  26[H]  ----------------------------<  113[H]  4.1   |  30  |  1.25    Ca    8.5      29 Dec 2024 06:28                        13.1   17.61 )-----------( 172      ( 26 Dec 2024 22:17 )             39.1     12-27    135  |  102  |  27[H]  ----------------------------<  145[H]  4.0   |  30  |  1.34[H]    Ca    9.0      27 Dec 2024 10:34  Phos  2.7     12-27  Mg     2.5     12-27    TPro  6.8  /  Alb  3.3  /  TBili  1.4[H]  /  DBili  x   /  AST  18  /  ALT  13  /  AlkPhos  78  12-26     LIVER FUNCTIONS - ( 26 Dec 2024 22:17 )  Alb: 3.3 g/dL / Pro: 6.8 gm/dL / ALK PHOS: 78 U/L / ALT: 13 U/L / AST: 18 U/L / GGT: x           Culture - Urine (collected 26 Dec 2024 23:42)  Source: Clean Catch None  Final Report (29 Dec 2024 11:00):    >100,000 CFU/ml Escherichia coli  Organism: Escherichia coli (29 Dec 2024 11:00)  Organism: Escherichia coli (29 Dec 2024 11:00)      Method Type: CLARISSA      -  Amoxicillin/Clavulanic Acid: I 16/8      -  Ampicillin: R >16 These ampicillin results predict results for amoxicillin      -  Ampicillin/Sulbactam: R >16/8      -  Aztreonam: S <=4      -  Cefazolin: S 16 For uncomplicated UTI with K. pneumoniae, E. coli, or P. mirablis: CLARISSA <=16 is sensitive and CLARISSA >=32 is resistant. This also predicts results for oral agents cefaclor, cefdinir, cefpodoxime, cefprozil, cefuroxime axetil, cephalexin and locarbeffor uncomplicated UTI. Note that some isolates may be susceptible to these agents while testing resistant to cefazolin.      -  Cefepime: S <=2      -  Cefoxitin: S <=8      -  Ceftriaxone: S <=1      -  Cefuroxime: S <=4      -  Ciprofloxacin: S <=0.25      -  Ertapenem: S <=0.5      -  Gentamicin: R >8      -  Imipenem: S <=1      -  Levofloxacin: S <=0.5      -  Meropenem: S <=1      -  Nitrofurantoin: S <=32 Should not be used to treat pyelonephritis      -  Piperacillin/Tazobactam: S <=8      -  Tobramycin: I 4      -  Trimethoprim/Sulfamethoxazole: R >2/38    Urinalysis with Rflx Culture (collected 26 Dec 2024 23:42)    Culture - Blood (collected 26 Dec 2024 22:17)  Source: .Blood BLOOD  Preliminary Report (30 Dec 2024 06:01):    No growth at 72 Hours    Culture - Blood (collected 26 Dec 2024 21:42)  Source: .Blood BLOOD  Gram Stain (27 Dec 2024 16:37):    Growth in aerobic bottle: Gram Negative Rods  Preliminary Report (28 Dec 2024 15:28):    Growth in aerobic bottle: Escherichia coli    Direct identification is available within approximately 3-5    hours either by Blood Panel Multiplexed PCR or Direct    MALDI-TOF. Details: [email or web address]  Organism: Blood Culture PCR  Escherichia coli (29 Dec 2024 10:10)  Organism: Escherichia coli (29 Dec 2024 10:10)      Method Type: CLARISSA      -  Ampicillin: R >16 These ampicillin results predict results for amoxicillin      -  Ampicillin/Sulbactam: R >16/8      -  Aztreonam: S <=4      -  Cefazolin: R >16      -  Cefepime: S <=2      -  Cefoxitin: S <=8      -  Ceftriaxone: S <=1      -  Ciprofloxacin: S <=0.25      -  Ertapenem: S <=0.5      -  Gentamicin: R >8      -  Imipenem: S <=1      -  Levofloxacin: S <=0.5      -  Meropenem: S <=1      -  Piperacillin/Tazobactam: S <=8      -  Tobramycin: R 8      -  Trimethoprim/Sulfamethoxazole: R >2/38  Organism: Blood Culture PCR (27 Dec 2024 18:10)      Method Type: PCR      -  Escherichia coli: Detec    Culture - Blood (collected 26 Dec 2024 21:32)  Source: .Blood BLOOD  Preliminary Report (30 Dec 2024 06:01):    No growth at 72 Hours    Radiology: all available radiological tests reviewed    < from: Xray Chest 1 View-PORTABLE IMMEDIATE (12.26.24 @ 21:45) >  No acute pulmonary process  < end of copied text >      Advanced directives addressed: full resuscitation
Date of service: 12-29-24 @ 12:24    Lying in bed in NAD  Weak looking  Has urinary frequency  Fever is down    ROS: no fever or chills; denies dizziness, no HA, no SOB or cough, no abdominal pain, no diarrhea or constipation; no legs pain, no rashes    MEDICATIONS  (STANDING):  ascorbic acid 500 milliGRAM(s) Oral daily  aspirin enteric coated 81 milliGRAM(s) Oral daily  atorvastatin 10 milliGRAM(s) Oral at bedtime  bisacodyl 5 milliGRAM(s) Oral at bedtime  cefTRIAXone Injectable. 2000 milliGRAM(s) IV Push every 24 hours  cholecalciferol 400 Unit(s) Oral daily  ferrous    sulfate 325 milliGRAM(s) Oral daily  fluticasone propionate 50 MICROgram(s)/spray Nasal Spray 1 Spray(s) Both Nostrils <User Schedule>  furosemide    Tablet 40 milliGRAM(s) Oral daily  lidocaine   4% Patch 1 Patch Transdermal every 24 hours  pantoprazole    Tablet 40 milliGRAM(s) Oral two times a day  polyethylene glycol 3350 17 Gram(s) Oral two times a day  senna 2 Tablet(s) Oral at bedtime  sotalol. 80 milliGRAM(s) Oral every 12 hours    Vital Signs Last 24 Hrs  T(C): 36.7 (29 Dec 2024 07:45), Max: 36.8 (28 Dec 2024 21:53)  T(F): 98.1 (29 Dec 2024 07:45), Max: 98.2 (28 Dec 2024 21:53)  HR: 60 (29 Dec 2024 07:45) (60 - 72)  BP: 143/65 (29 Dec 2024 07:45) (120/57 - 143/65)  BP(mean): --  RR: 17 (29 Dec 2024 07:45) (17 - 18)  SpO2: 95% (29 Dec 2024 07:45) (93% - 95%)    Parameters below as of 29 Dec 2024 07:45  Patient On (Oxygen Delivery Method): room air     Physical exam:    Constitutional:  No acute distress  HEENT: NC/AT, EOMI, PERRLA, conjunctivae clear; ears and nose atraumatic; pharynx benign  Neck: supple; thyroid not palpable  Back: no tenderness  Respiratory: respiratory effort normal; clear to auscultation  Cardiovascular: S1S2 regular, no murmurs  Abdomen: soft, not tender, not distended, positive BS; no liver or spleen organomegaly  Genitourinary: no suprapubic tenderness  Lymphatic: no LN palpable  Musculoskeletal: no muscle tenderness, no joint swelling or tenderness  Extremities: no pedal edema  Neurological/ Psychiatric: AxOx3, judgement and insight normal; moving all extremities  Skin: no rashes; no palpable lesions    Labs: reviewed                        10.8   11.00 )-----------( 154      ( 29 Dec 2024 06:28 )             32.4     12-29    134[L]  |  101  |  26[H]  ----------------------------<  113[H]  4.1   |  30  |  1.25    Ca    8.5      29 Dec 2024 06:28  Phos  2.7     12-28  Mg     2.6     12-28                        13.1   17.61 )-----------( 172      ( 26 Dec 2024 22:17 )             39.1     12-27    135  |  102  |  27[H]  ----------------------------<  145[H]  4.0   |  30  |  1.34[H]    Ca    9.0      27 Dec 2024 10:34  Phos  2.7     12-27  Mg     2.5     12-27    TPro  6.8  /  Alb  3.3  /  TBili  1.4[H]  /  DBili  x   /  AST  18  /  ALT  13  /  AlkPhos  78  12-26     LIVER FUNCTIONS - ( 26 Dec 2024 22:17 )  Alb: 3.3 g/dL / Pro: 6.8 gm/dL / ALK PHOS: 78 U/L / ALT: 13 U/L / AST: 18 U/L / GGT: x           Culture - Urine (collected 26 Dec 2024 23:42)  Source: Clean Catch None  Final Report (29 Dec 2024 11:00):    >100,000 CFU/ml Escherichia coli  Organism: Escherichia coli (29 Dec 2024 11:00)  Organism: Escherichia coli (29 Dec 2024 11:00)      Method Type: CLARISSA      -  Amoxicillin/Clavulanic Acid: I 16/8      -  Ampicillin: R >16 These ampicillin results predict results for amoxicillin      -  Ampicillin/Sulbactam: R >16/8      -  Aztreonam: S <=4      -  Cefazolin: S 16 For uncomplicated UTI with K. pneumoniae, E. coli, or P. mirablis: CLARISSA <=16 is sensitive and CLARISSA >=32 is resistant. This also predicts results for oral agents cefaclor, cefdinir, cefpodoxime, cefprozil, cefuroxime axetil, cephalexin and locarbeffor uncomplicated UTI. Note that some isolates may be susceptible to these agents while testing resistant to cefazolin.      -  Cefepime: S <=2      -  Cefoxitin: S <=8      -  Ceftriaxone: S <=1      -  Cefuroxime: S <=4      -  Ciprofloxacin: S <=0.25      -  Ertapenem: S <=0.5      -  Gentamicin: R >8      -  Imipenem: S <=1      -  Levofloxacin: S <=0.5      -  Meropenem: S <=1      -  Nitrofurantoin: S <=32 Should not be used to treat pyelonephritis      -  Piperacillin/Tazobactam: S <=8      -  Tobramycin: I 4      -  Trimethoprim/Sulfamethoxazole: R >2/38    Urinalysis with Rflx Culture (collected 26 Dec 2024 23:42)    Culture - Blood (collected 26 Dec 2024 22:17)  Source: .Blood BLOOD  Preliminary Report (29 Dec 2024 06:00):    No growth at 48 Hours    Culture - Blood (collected 26 Dec 2024 21:42)  Source: .Blood BLOOD  Gram Stain (27 Dec 2024 16:37):    Growth in aerobic bottle: Gram Negative Rods  Preliminary Report (28 Dec 2024 15:28):    Growth in aerobic bottle: Escherichia coli    Direct identification is available within approximately 3-5    hours either by Blood Panel Multiplexed PCR or Direct    MALDI-TOF. Details: [email or web address]  Organism: Blood Culture PCR  Escherichia coli (29 Dec 2024 10:10)  Organism: Escherichia coli (29 Dec 2024 10:10)      Method Type: CALRISSA      -  Ampicillin: R >16 These ampicillin results predict results for amoxicillin      -  Ampicillin/Sulbactam: R >16/8      -  Aztreonam: S <=4      -  Cefazolin: R >16      -  Cefepime: S <=2      -  Cefoxitin: S <=8      -  Ceftriaxone: S <=1      -  Ciprofloxacin: S <=0.25      -  Ertapenem: S <=0.5      -  Gentamicin: R >8      -  Imipenem: S <=1      -  Levofloxacin: S <=0.5      -  Meropenem: S <=1      -  Piperacillin/Tazobactam: S <=8      -  Tobramycin: R 8      -  Trimethoprim/Sulfamethoxazole: R >2/38  Organism: Blood Culture PCR (27 Dec 2024 18:10)      Method Type: PCR      -  Escherichia coli: Detec    Culture - Blood (collected 26 Dec 2024 21:32)  Source: .Blood BLOOD  Preliminary Report (29 Dec 2024 06:00):    No growth at 48 Hours    Radiology: all available radiological tests reviewed    < from: Xray Chest 1 View-PORTABLE IMMEDIATE (12.26.24 @ 21:45) >  No acute pulmonary process  < end of copied text >      Advanced directives addressed: full resuscitation
HOSPITALIST ATTENDING PROGRESS NOTE    Chart and meds reviewed.  Patient seen and examined.    Subjective:  Patient seen this morning sitting upright in bed.  Reports that she feels like she has a poor appetite, but otherwise denies chest pain at this time.  Patient with low-grade fever overnight.  Blood cultures showing E. coli, consistent with urine cultures.  Sensitivities pending.    All other systems reviewed and found to be negative with the exception of what has been described above.    MEDICATIONS  (STANDING):  ascorbic acid 500 milliGRAM(s) Oral daily  aspirin enteric coated 81 milliGRAM(s) Oral daily  atorvastatin 10 milliGRAM(s) Oral at bedtime  bisacodyl 5 milliGRAM(s) Oral at bedtime  cefTRIAXone Injectable. 2000 milliGRAM(s) IV Push every 24 hours  cholecalciferol 400 Unit(s) Oral daily  ferrous    sulfate 325 milliGRAM(s) Oral daily  fluticasone propionate 50 MICROgram(s)/spray Nasal Spray 1 Spray(s) Both Nostrils <User Schedule>  lidocaine   4% Patch 1 Patch Transdermal every 24 hours  pantoprazole    Tablet 40 milliGRAM(s) Oral two times a day  polyethylene glycol 3350 17 Gram(s) Oral two times a day  senna 2 Tablet(s) Oral at bedtime  sodium chloride 0.9% Bolus 500 milliLiter(s) IV Bolus once  sotalol. 80 milliGRAM(s) Oral every 12 hours    MEDICATIONS  (PRN):  acetaminophen     Tablet .. 650 milliGRAM(s) Oral every 6 hours PRN Mild Pain (1 - 3)  oxycodone    5 mG/acetaminophen 325 mG 0.5 Tablet(s) Oral every 6 hours PRN Severe Pain (7 - 10)      VITALS:  T(F): 98.4 (12-28-24 @ 07:15), Max: 100.2 (12-27-24 @ 15:31)  HR: 62 (12-28-24 @ 07:15) (61 - 65)  BP: 127/56 (12-28-24 @ 07:15) (98/48 - 133/45)  RR: 17 (12-28-24 @ 07:15) (17 - 18)  SpO2: 94% (12-28-24 @ 07:15) (93% - 98%)  Wt(kg): --    I&O's Summary    27 Dec 2024 07:01  -  28 Dec 2024 07:00  --------------------------------------------------------  IN: 0 mL / OUT: 800 mL / NET: -800 mL        CAPILLARY BLOOD GLUCOSE          PHYSICAL EXAM:  Gen: NAD, comfortable, AA&Ox4  HEENT: head atrumatic and normocephalic, EOMI  CVS: +s1, s2, regular rate and rhythm, no murmurs, rubs or gallops  Pulmonary: normal respiratory effort, clear to auscultation b/l, no wheezes/crackles/rhonchi  Abdomen: soft, non-tender, non-distended, +bowel sounds in all 4 quadrants, no masses noted, no guarding or rigidity   Back: no CVA tenderness   Extremities: no pedal edema, pedal pulses palpable  Skin: nl warm and dry, no wounds   Neuro: answering questions appropriately, face symmetric    LABS:                            11.0   14.12 )-----------( 141      ( 28 Dec 2024 10:36 )             32.3     12-28    132[L]  |  97  |  26[H]  ----------------------------<  129[H]  3.8   |  28  |  1.37[H]    Ca    8.5      28 Dec 2024 08:13  Phos  2.7     12-28  Mg     2.6     12-28    TPro  6.8  /  Alb  3.3  /  TBili  1.4[H]  /  DBili  x   /  AST  18  /  ALT  13  /  AlkPhos  78  12-26        LIVER FUNCTIONS - ( 26 Dec 2024 22:17 )  Alb: 3.3 g/dL / Pro: 6.8 gm/dL / ALK PHOS: 78 U/L / ALT: 13 U/L / AST: 18 U/L / GGT: x           PT/INR - ( 26 Dec 2024 21:42 )   PT: 10.7 sec;   INR: 0.91 ratio         PTT - ( 26 Dec 2024 21:42 )  PTT:33.8 sec  Urinalysis Basic - ( 28 Dec 2024 08:13 )    Color: x / Appearance: x / SG: x / pH: x  Gluc: 129 mg/dL / Ketone: x  / Bili: x / Urobili: x   Blood: x / Protein: x / Nitrite: x   Leuk Esterase: x / RBC: x / WBC x   Sq Epi: x / Non Sq Epi: x / Bacteria: x              CULTURES:    Ucx and Bcx 12/27 growing E coli       Additional results/Imaging, I have personally reviewed:    Telemetry, personally reviewed:

## 2024-12-30 NOTE — PROGRESS NOTE ADULT - ASSESSMENT
89-year-old female with h/o recurrent urinary tract infections, atrial fibrillation s/p cardioversion, with watchman (anticoagulation previously discontinued given hemorrhagic bursitis, renal hematoma 2019), hemophilia C, hypertension, hyperlipidemia, GERD, esophageal stricture/achalasia s/p endoscopic POEM 2017, osteoarthritis bilateral knees and shoulders, degenerative disc disease, spinal stenosis, sciatica was admitted on 12/27 for feeling unwell with chills at home x one day.  Patient reports that she does not have dysuria at this time but that she feels like she has a urinary tract infection. Last UTI treated approximately 3 weeks prior, notes that she has been having poor p.o. intake over the last 1-2 days. In ER she was noted febrile and received ceftriaxone.    #Sepsis with E.coli  #Pyuria. UTI with E.coli  #Urinary bladder disfunction with recurrent UTIs  #Allergy to Levaquin and Bactrim  -cultures reviewed  -on ceftriaxone 2 gm IV qd # 2  -tolerating abx well so far; no side effects noted  -continue abx coverage  -f/u cultures  -repeat BC x 2 in AM  -monitor temps  -f/u CBC  -supportive care  2. Other issues:   -care per medicine    d/w Dr. Alexis    Clinical team may change from intravenous to oral antibiotics when the following criteria are met:   1. Patient is clinically improving/stable       a)	Improved signs and symptoms of infection from initial presentation       b)	Afebrile for 24 hours       c)	Leukocytosis trending towards normal range   2. Patient is tolerating oral intake   3. Initial/repeat blood cultures are negative     When above criteria met may change iv antibiotics to an oral agent  Cannot advise changing to oral antibiotic therapy until culture sensitivity is available.    
89-year-old female past medical history notable for recurrent urinary tract infections, atrial fibrillation status post cardioversion now with watchman (anticoagulation previously discontinued given hemorrhagic bursitis, renal hematoma 2019), hemophilia C, hypertension, hyperlipidemia, GERD, esophageal stricture/achalasia status post endoscopic POEM 2017, osteoarthritis bilateral knees and shoulders, degenerative disc disease, was spinal stenosis, sciatica, who presents with 1 day history of feeling unwell with chills at home.     Tmax 101 °F in ED, patient reports her presentation similar to prior urinary tract infections.    #Urinary tract infection, complicated (systemic symptoms including fever, history of recurrent infections)  #E. coli UTI  #E. coli bacteremia  #Sepsis, POA  #elevated t-leonard  – Patient endorses present symptoms consistent with prior UTIs  – Labs notable for WBC 17.61 on admission, urinalysis with greater than 998 WBCs  – Chest x-ray negative for any acute cardiopulmonary process, respiratory viral panel negative  – Leukocytosis downtrending, patient still feels unwell with low-grade fever  – Infectious disease consulted, appreciate recommendations  – Increase ceftriaxone to bacteremia dosing, 2 g once daily  – Follow-up sensitivities  – Patient without abdominal pain on exam but given elevated total bilirubin, will obtain right upper quadrant ultrasound to evaluate for cholecystitis    #Acute kidney injury, likely prerenal, Improved  – Patient is unsure of her baseline creatinine but denies any history of CKD  – Creatinine 1.44 on admission, patient reports poor p.o. intake on days prior to admission  – Status post 500 cc IV fluid bolus 12/27, still with slightly elevated creatinine so additional 500 cc IV bolus 12/28  – Ultrasound kidneys ordered to rule out pyelonephritis  – Will continue to hold home Lasix for now, suspect patient may have been slightly over diuresed  – Suspect prerenal etiology will continue to monitor      #Atrial fibrillation status post cardioversion 2014  #Status post watchman 2022  #Status post pacemaker 2021  – Patient presently not on any anticoagulation given history of renal hematoma and hemorrhagic bursitis in the past, Watchman 2021  – EKG on admission showing sinus rhythm with first-degree AV block ([place] interval 232), no evidence of dropped beats  – Continue with home sotalol 80 mg twice daily, hold parameters; QTc 448 ms    #Hx of CHF, unknown last EF   – Per home medications, patient is on Lasix 40 mg once daily  – Hold home Lasix for now given JHOANA, bilateral lower extremities without evidence of pitting edema, denies dyspnea on exertion/orthopnea  – No 2D echo on file    #Osteoarthritis, bilateral shoulders and knees  #Degenerative disc disease  #Spinal stenosis  #Spondylolithiasis  #Sciatica  – Continue home Percocet 2.5 mg as needed every 6 hours  – Continue with home lidocaine patch    #?Coronary artery disease  – Patient follows with Dr. Romero ([facility])  – Denies chest pain, shortness of breath  – Continue with home aspirin for ?Primary prevention    #Esophageal stricture  #Achalasia  #GERD  #Status post pulm 2017  – Continue with home pantoprazole 40 mg twice daily    #Hyperlipidemia  – Continue with home lovastatin, converted to atorvastatin while admitted    #Iron deficiency anemia  – Continue with ferrous sulfate once daily    F: encourage PO intake   E: PRN   N: DASH diet   A: with assistance     DVT PPx: avoid chemical PPx given hx Hemophilia, SCDs for now   GI PPx: home pantoprazole twice daily     FULL CODE   HCP : Daughter Izzy [phone]
89-year-old female with h/o recurrent urinary tract infections, atrial fibrillation s/p cardioversion, with watchman (anticoagulation previously discontinued given hemorrhagic bursitis, renal hematoma 2019), hemophilia C, hypertension, hyperlipidemia, GERD, esophageal stricture/achalasia s/p endoscopic POEM 2017, osteoarthritis bilateral knees and shoulders, degenerative disc disease, spinal stenosis, sciatica was admitted on 12/27 for feeling unwell with chills at home x one day.  Patient reports that she does not have dysuria at this time but that she feels like she has a urinary tract infection. Last UTI treated approximately 3 weeks prior, notes that she has been having poor p.o. intake over the last 1-2 days. In ER she was noted febrile and received ceftriaxone.    #Sepsis with E.coli  #Pyuria. UTI with E.coli  #Urinary bladder disfunction with recurrent UTIs  #Allergy to Levaquin and Bactrim  -cultures reviewed  -on ceftriaxone 2 gm IV qd # 3  -tolerating abx well so far; no side effects noted  -continue abx coverage  -f/u cultures  -repeat BC x 2 collected  -monitor temps  -f/u CBC  -supportive care  2. Other issues:   -care per medicine    d/w Dr. Alexis    Clinical team may change from intravenous to oral antibiotics when the following criteria are met:   1. Patient is clinically improving/stable       a)	Improved signs and symptoms of infection from initial presentation       b)	Afebrile for 24 hours       c)	Leukocytosis trending towards normal range   2. Patient is tolerating oral intake   3. Initial/repeat blood cultures are negative     When above criteria met may change iv antibiotics to ceftin 500 mg PO q12h for 10 more days    
89-year-old female past medical history notable for recurrent urinary tract infections, atrial fibrillation status post cardioversion now with watchman (anticoagulation previously discontinued given hemorrhagic bursitis, renal hematoma 2019), hemophilia C, hypertension, hyperlipidemia, GERD, esophageal stricture/achalasia status post endoscopic POEM 2017, osteoarthritis bilateral knees and shoulders, degenerative disc disease, was spinal stenosis, sciatica, who presents with 1 day history of feeling unwell with chills at home.     Tmax 101 °F in ED, patient reports her presentation similar to prior urinary tract infections.    #Urinary tract infection, complicated (systemic symptoms including fever, history of recurrent infections)  #E. coli UTI  #E. coli bacteremia  #Sepsis, POA  #elevated t-leonard  – Patient endorses present symptoms consistent with prior UTIs  – Labs notable for WBC 17.61 on admission, urinalysis with greater than 998 WBCs  – Chest x-ray negative for any acute cardiopulmonary process, respiratory viral panel negative  – Leukocytosis downtrending, patient still feels unwell with low-grade fever  – Infectious disease consulted, appreciate recommendations  – Increase ceftriaxone to bacteremia dosing, 2 g once daily  – Follow-up sensitivities  – Patient without abdominal pain on exam but given elevated total bilirubin, will obtain right upper quadrant ultrasound to evaluate for cholecystitis    #Acute kidney injury, likely prerenal, Improved  – Patient is unsure of her baseline creatinine but denies any history of CKD  – Creatinine 1.44 on admission, patient reports poor p.o. intake on days prior to admission  – Status post 500 cc IV fluid bolus 12/27, still with slightly elevated creatinine so additional 500 cc IV bolus 12/28  – Ultrasound kidneys ordered to rule out pyelonephritis  – Will resume home Lasix 40 mg once daily    #Atrial fibrillation status post cardioversion 2014  #Status post watchman 2022  #Status post pacemaker 2021  – Patient presently not on any anticoagulation given history of renal hematoma and hemorrhagic bursitis in the past, Watchman 2021  – EKG on admission showing sinus rhythm with first-degree AV block ([place] interval 232), no evidence of dropped beats  – Continue with home sotalol 80 mg twice daily, hold parameters; QTc 448 ms    #Hx of CHF, unknown last EF   – Per home medications, patient is on Lasix 40 mg once daily  – No 2D echo on file    #Osteoarthritis, bilateral shoulders and knees  #Degenerative disc disease  #Spinal stenosis  #Spondylolithiasis  #Sciatica  – Continue home Percocet 2.5 mg as needed every 6 hours  – Continue with home lidocaine patch    #?Coronary artery disease  – Patient follows with Dr. Romero ([facility])  – Denies chest pain, shortness of breath  – Continue with home aspirin for ?Primary prevention    #Esophageal stricture  #Achalasia  #GERD  #Status post pulm 2017  – Continue with home pantoprazole 40 mg twice daily    #Hyperlipidemia  – Continue with home lovastatin, converted to atorvastatin while admitted    #Iron deficiency anemia  – Continue with ferrous sulfate once daily    F: encourage PO intake   E: PRN   N: DASH diet   A: with assistance     DVT PPx: avoid chemical PPx given hx Hemophilia, SCDs for now   GI PPx: home pantoprazole twice daily     FULL CODE   HCP : Daughter Izzy [phone]

## 2024-12-30 NOTE — DISCHARGE NOTE PROVIDER - HOSPITAL COURSE
89-year-old female past medical history notable for recurrent urinary tract infections, atrial fibrillation status post cardioversion now with watchman (anticoagulation previously discontinued given hemorrhagic bursitis, renal hematoma 2019), hemophilia C, hypertension, hyperlipidemia, GERD, esophageal stricture/achalasia status post endoscopic POEM 2017, osteoarthritis bilateral knees and shoulders, degenerative disc disease, was spinal stenosis, sciatica, who presents with 1 day history of feeling unwell with chills at home.  Patient reports that she does not have dysuria at time of admission but she feels like she has a urinary tract infection.  Last UTI treated approximately 3 weeks prior, notes that she has been having poor p.o. intake over the last 1-2 days.  Otherwise denies chest pain, shortness of breath, palpitations, nausea, vomiting, or abdominal pain.    While admitted patient found to have E. coli on urine culture and blood culture, initially 12/26.  Patient symptomatically improved on IV ceftriaxone, patient remained afebrile since 12/27.  Patient to be discharged on Ceftin 500 mg p.o. every 12 hours for 10 additional days.  Patient's hospital course notable for mild acute kidney injury for which Lasix was initially held, later resumed.      #Urinary tract infection, complicated (systemic symptoms including fever, history of recurrent infections)  #E. coli UTI  #E. coli bacteremia  #Sepsis, POA  #elevated t-leonard  – Patient endorses present symptoms consistent with prior UTIs  – Labs notable for WBC 17.61 on admission, urinalysis with greater than 998 WBCs  – Chest x-ray negative for any acute cardiopulmonary process, respiratory viral panel negative  – Leukocytosis downtrending  – Infectious disease consulted, appreciate recommendations  – Increase ceftriaxone to bacteremia dosing, 2 g once daily  – Follow-up sensitivities  – Patient without abdominal pain on exam but given elevated total bilirubin, will obtain right upper quadrant ultrasound to evaluate for cholecystitis    #Acute kidney injury, likely prerenal, Improved  – Patient is unsure of her baseline creatinine but denies any history of CKD  – Creatinine 1.44 on admission, patient reports poor p.o. intake on days prior to admission  – Status post 500 cc IV fluid bolus 12/27, still with slightly elevated creatinine so additional 500 cc IV bolus 12/28  – Ultrasound kidneys ordered to rule out pyelonephritis  – Will resume home Lasix 40 mg once daily    #Atrial fibrillation status post cardioversion 2014  #Status post watchman 2022  #Status post pacemaker 2021  – Patient presently not on any anticoagulation given history of renal hematoma and hemorrhagic bursitis in the past, Watchman 2021  – EKG on admission showing sinus rhythm with first-degree AV block ([place] interval 232), no evidence of dropped beats  – Continue with home sotalol 80 mg twice daily, hold parameters; QTc 448 ms    #Hx of CHF, unknown last EF   – Per home medications, patient is on Lasix 40 mg once daily  – No 2D echo on file    #Osteoarthritis, bilateral shoulders and knees  #Degenerative disc disease  #Spinal stenosis  #Spondylolithiasis  #Sciatica  – Continue home Percocet 2.5 mg as needed every 6 hours  – Continue with home lidocaine patch    #?Coronary artery disease  – Patient follows with Dr. Romero ([facility])  – Denies chest pain, shortness of breath  – Continue with home aspirin for ?Primary prevention    #Esophageal stricture  #Achalasia  #GERD  #Status post pulm 2017  – Continue with home pantoprazole 40 mg twice daily    #Hyperlipidemia  – Continue with home lovastatin, converted to atorvastatin while admitted    #Iron deficiency anemia  – Continue with ferrous sulfate once daily    F: encourage PO intake   E: PRN   N: DASH diet   A: with assistance     DVT PPx: avoid chemical PPx given hx Hemophilia, SCDs for now   GI PPx: home pantoprazole twice daily     FULL CODE   HCP : Daughter Izzy [phone]

## 2024-12-30 NOTE — DISCHARGE NOTE PROVIDER - DATE OF DISCHARGE SERVICE:
30-Dec-2024 Preventing Osteoporosis: Care Instructions  Your Care Instructions    Osteoporosis means the bones are weak and thin enough that they can break easily. The older you are, the more likely you are to get osteoporosis. But with plenty of calcium, vitamin D, and exercise, you can help prevent osteoporosis. The preteen and teen years are a key time for bone building. With the help of calcium, vitamin D, and exercise in those early years and beyond, the bones reach their peak density and strength by age 27. After age 27, your bones naturally start to thin and weaken. The stronger your bones are at around age 27, the lower your risk for osteoporosis. But no matter what your age and risk are, your bones still need calcium, vitamin D, and exercise to stay strong. Also avoid smoking, and limit alcohol. Smoking and heavy alcohol use can make your bones thinner. Talk to your doctor about any special risks you might have, such as having a close relative with osteoporosis or taking a medicine that can weaken bones. Your doctor can tell you the best ways to protect your bones from thinning. Follow-up care is a key part of your treatment and safety. Be sure to make and go to all appointments, and call your doctor if you are having problems. It's also a good idea to know your test results and keep a list of the medicines you take. How can you care for yourself at home? · Get enough calcium and vitamin D. The [place] of Medicine recommends adults younger than age 46 need 1,000 mg of calcium and 600 IU of vitamin D each day. Women ages 46 to 79 need 1,200 mg of calcium and 600 IU of vitamin D each day. Men ages 46 to 79 need 1,000 mg of calcium and 600 IU of vitamin D each day. Adults 71 and older need 1,200 mg of calcium and 800 IU of vitamin D each day. ? Eat foods rich in calcium, like yogurt, cheese, milk, and dark green vegetables. ? Eat foods rich in vitamin D, like eggs, fatty fish, cereal, and fortified milk.   ? Get some sunshine. Your body uses sunshine to make its own vitamin D. The safest time to be out in the sun is before 10 a.m. or after 3 p.m. Avoid getting sunburned. Sunburn can increase your risk of skin cancer. ? Talk to your doctor about taking a calcium plus vitamin D supplement. Ask about what type of calcium is right for you, and how much to take at a time. Adults ages 23 to 48 should not get more than 2,500 mg of calcium and 4,000 IU of vitamin D each day, whether it is from supplements and/or food. Adults ages 46 and older should not get more than 2,000 mg of calcium and 4,000 IU of vitamin D each day from supplements and/or food. · Get regular bone-building exercise. Weight-bearing and resistance exercises keep bones healthy by working the muscles and bones against gravity. Start out at an exercise level that feels right for you. Add a little at a time until you can do the following:  ? Do 30 minutes of weight-bearing exercise on most days of the week. Walking, jogging, stair climbing, and dancing are good choices. ? Do resistance exercises with weights or elastic bands 2 to 3 days a week. · Limit alcohol. Drink no more than 1 alcohol drink a day if you are a woman. Drink no more than 2 alcohol drinks a day if you are a man. · Do not smoke. Smoking can make bones thin faster. If you need help quitting, talk to your doctor about stop-smoking programs and medicines. These can increase your chances of quitting for good. When should you call for help? Watch closely for changes in your health, and be sure to contact your doctor if you have any problems. Where can you learn more? Go to [email or web address]://katlin.[email or web address] org and sign in to your [employer] account. Enter S227 in the Connexity box to learn more about \"Preventing Osteoporosis: Care Instructions. \"     If you do not have an account, please click on the \"Sign Up Now\" link.   Current as of: November 7, 2018  Content Version: 12.0  © 5411-1680 Healthwise, Incorporated. Care instructions adapted under license by [facility] ([facility]). If you have questions about a medical condition or this instruction, always ask your healthcare professional. [address] any warranty or liability for your use of this information. Learning About Breast Cancer Screening  What is breast cancer screening? Breast cancer occurs when cells that are not normal grow in one or both of your breasts. Screening tests can help find breast cancer early. Cancer is easier to treat when it's found early. Having concerns about breast cancer is common. That's why it's important to talk with your doctor about when to start and how often to get screened for breast cancer. How is breast cancer screening done? Several screening tests can be used to check for breast cancer. · Mammograms check for signs of cancer using X-rays. They can show tumors that are too small for you or your doctor to feel. During a mammogram, a machine squeezes your breasts to make them flatter and easier to X-ray. At least two pictures are taken of each breast. One is taken from the top and one from the side. · 3-D mammograms are also called digital breast tomosynthesis. Your breast is positioned on a flat plate. A top plate is pressed against your breast to keep it in position. The X-ray arm then moves in an arc above the breast and takes many pictures. A computer uses these X-rays to create a three-dimensional image. · Clinical breast exams are a doctor's exam. Your doctor carefully feels your breasts and under your arms to check for lumps or other changes. After the screening, your doctor will tell you the results. You will also be told if you need any follow-up tests. When should you get screened? Talk with your doctor about when you should start being tested for breast cancer. How often you get tested and the kind of tests you get will depend on your age and your risk.   The guidelines that follow are for women who have an average risk for breast cancer. If you have a higher risk for breast cancer, such as having a family history of breast cancer in multiple relatives or at a young age, your doctor may recommend different screening for you. · Ages 21 to 44: Some experts recommend that women have a clinical breast exam every 3 years, starting at age 21. Ask your doctor how often you should have this test. If you have a high risk for breast cancer, talk with your doctor about when to start yearly mammograms and other screening tests. · Ages 36 and older: Talk with your doctor about how often you should have mammograms and clinical breast exams. What is your risk for breast cancer? If you don't already know your risk of breast cancer, you can ask your doctor about it. You can also look it up at www.cancer.gov/bcrisktool/. If your doctor says that you have a high or very high risk, ask about ways to reduce your risk. These could include getting extra screening, taking medicine, or having surgery. If you have a strong family history of breast cancer, ask your doctor about genetic testing. What steps can you take to stay healthy? Some things that increase your risk of breast cancer, such as your age and being female, cannot be controlled. But you can do some things to stay as healthy as you can. · Learn what your breasts normally look and feel like. If you notice any changes, tell your doctor. · Drink alcohol wisely. Your risk goes up the more you drink. For the best health, women should have no more than 1 drink a day or 7 drinks a week. · If you smoke, quit. When you quit smoking, you lower your chances of getting many types of cancer. You can also do your best to eat well, be active, and stay at a healthy weight. Eating healthy foods and being active every day, as well as staying at a healthy weight, may help prevent cancer. Where can you learn more?   Go to https://chpepiceweb.[email or web address] org and sign in to your [employer] account. Enter H044 in the [place] box to learn more about \"Learning About Breast Cancer Screening. \"     If you do not have an account, please click on the \"Sign Up Now\" link. Current as of: December 19, 2018  Content Version: 12.0  © 0415-1381 [employer]. Care instructions adapted under license by [facility] ([facility]). If you have questions about a medical condition or this instruction, always ask your healthcare professional. [address] any warranty or liability for your use of this information. Pap Test: Care Instructions  Your Care Instructions    The Pap test (also called a Pap smear) is a screening test for cancer of the cervix, which is the lower part of the uterus that opens into the vagina. The test can help your doctor find early changes in the cells that could lead to cancer. The sample of cells taken during your test has been sent to a lab so that an expert can look at the cells. It usually takes a week or two to get the results back. Follow-up care is a key part of your treatment and safety. Be sure to make and go to all appointments, and call your doctor if you are having problems. It's also a good idea to know your test results and keep a list of the medicines you take. What do the results mean? · A normal result means that the test did not find any abnormal cells in the sample. · An abnormal result can mean many things. Most of these are not cancer. The results of your test may be abnormal because:  ? You have an infection of the vagina or cervix, such as a yeast infection. ? You have an IUD (intrauterine device for birth control). ? You have low estrogen levels after menopause that are causing the cells to change. ? You have cell changes that may be a sign of precancer or cancer. The results are ranked based on how serious the changes might be.   There are many other reasons why you might not get a normal result. If the results were abnormal, you may need to get another test within a few weeks or months. If the results show changes that could be a sign of cancer, you may need a test called a colposcopy, which provides a more complete view of the cervix. Sometimes the lab cannot use the sample because it does not contain enough cells or was not preserved well. If so, you may need to have the test again. This is not common, but it does happen from time to time. When should you call for help? Watch closely for changes in your health, and be sure to contact your doctor if:    · You have vaginal bleeding or pain for more than 2 days after the test. It is normal to have a small amount of bleeding for a day or two after the test.   Where can you learn more? Go to [email or web address] org and sign in to your [employer] account. Enter S745 in the Qype box to learn more about \"Pap Test: Care Instructions. \"     If you do not have an account, please click on the \"Sign Up Now\" link. Current as of: December 19, 2018  Content Version: 12.0  © 0273-0896 Healthwise, Incorporated. Care instructions adapted under license by [facility] ([facility]). If you have questions about a medical condition or this instruction, always ask your healthcare professional. [address] any warranty or liability for your use of this information.

## 2024-12-30 NOTE — DISCHARGE NOTE PROVIDER - PROVIDER TOKENS
PROVIDER:[TOKEN:[[id number]],FOLLOWUP:[2 weeks],ESTABLISHEDPATIENT:[T]],PROVIDER:[TOKEN:[[id number]],FOLLOWUP:[2 weeks],ESTABLISHEDPATIENT:[T]],PROVIDER:[TOKEN:[[id number]],FOLLOWUP:[2 weeks],ESTABLISHEDPATIENT:[T]]

## 2025-01-01 LAB
CULTURE RESULTS: SIGNIFICANT CHANGE UP
CULTURE RESULTS: SIGNIFICANT CHANGE UP
SPECIMEN SOURCE: SIGNIFICANT CHANGE UP
SPECIMEN SOURCE: SIGNIFICANT CHANGE UP

## 2025-01-02 ENCOUNTER — EMERGENCY (EMERGENCY)
Facility: HOSPITAL | Age: 89
LOS: 0 days | Discharge: HOME | End: 2025-01-02
Attending: STUDENT IN AN ORGANIZED HEALTH CARE EDUCATION/TRAINING PROGRAM
Payer: MEDICARE

## 2025-01-02 VITALS
HEART RATE: 63 BPM | SYSTOLIC BLOOD PRESSURE: 149 MMHG | OXYGEN SATURATION: 97 % | TEMPERATURE: 98 F | DIASTOLIC BLOOD PRESSURE: 48 MMHG | RESPIRATION RATE: 20 BRPM

## 2025-01-02 VITALS — HEIGHT: 61 IN

## 2025-01-02 DIAGNOSIS — Z88.1 ALLERGY STATUS TO OTHER ANTIBIOTIC AGENTS: ICD-10-CM

## 2025-01-02 DIAGNOSIS — I10 ESSENTIAL (PRIMARY) HYPERTENSION: ICD-10-CM

## 2025-01-02 DIAGNOSIS — E78.5 HYPERLIPIDEMIA, UNSPECIFIED: ICD-10-CM

## 2025-01-02 DIAGNOSIS — Z98.49 CATARACT EXTRACTION STATUS, UNSPECIFIED EYE: Chronic | ICD-10-CM

## 2025-01-02 DIAGNOSIS — D72.829 ELEVATED WHITE BLOOD CELL COUNT, UNSPECIFIED: ICD-10-CM

## 2025-01-02 DIAGNOSIS — Z88.2 ALLERGY STATUS TO SULFONAMIDES: ICD-10-CM

## 2025-01-02 DIAGNOSIS — I48.91 UNSPECIFIED ATRIAL FIBRILLATION: ICD-10-CM

## 2025-01-02 DIAGNOSIS — M48.00 SPINAL STENOSIS, SITE UNSPECIFIED: ICD-10-CM

## 2025-01-02 DIAGNOSIS — K21.9 GASTRO-ESOPHAGEAL REFLUX DISEASE WITHOUT ESOPHAGITIS: ICD-10-CM

## 2025-01-02 DIAGNOSIS — Z96.659 PRESENCE OF UNSPECIFIED ARTIFICIAL KNEE JOINT: Chronic | ICD-10-CM

## 2025-01-02 DIAGNOSIS — R11.2 NAUSEA WITH VOMITING, UNSPECIFIED: ICD-10-CM

## 2025-01-02 DIAGNOSIS — N39.0 URINARY TRACT INFECTION, SITE NOT SPECIFIED: ICD-10-CM

## 2025-01-02 DIAGNOSIS — M19.90 UNSPECIFIED OSTEOARTHRITIS, UNSPECIFIED SITE: ICD-10-CM

## 2025-01-02 PROBLEM — I50.9 HEART FAILURE, UNSPECIFIED: Chronic | Status: ACTIVE | Noted: 2024-12-29

## 2025-01-02 LAB
ADD ON TEST-SPECIMEN IN LAB: SIGNIFICANT CHANGE UP
ALBUMIN SERPL ELPH-MCNC: 3.3 G/DL — SIGNIFICANT CHANGE UP (ref 3.3–5)
ALP SERPL-CCNC: 84 U/L — SIGNIFICANT CHANGE UP (ref 40–120)
ALT FLD-CCNC: 20 U/L — SIGNIFICANT CHANGE UP (ref 12–78)
ANION GAP SERPL CALC-SCNC: 4 MMOL/L — LOW (ref 5–17)
APPEARANCE UR: ABNORMAL
AST SERPL-CCNC: 16 U/L — SIGNIFICANT CHANGE UP (ref 15–37)
BACTERIA # UR AUTO: NEGATIVE /HPF — SIGNIFICANT CHANGE UP
BASOPHILS # BLD AUTO: 0.09 K/UL — SIGNIFICANT CHANGE UP (ref 0–0.2)
BASOPHILS NFR BLD AUTO: 0.7 % — SIGNIFICANT CHANGE UP (ref 0–2)
BILIRUB SERPL-MCNC: 0.5 MG/DL — SIGNIFICANT CHANGE UP (ref 0.2–1.2)
BILIRUB UR-MCNC: NEGATIVE — SIGNIFICANT CHANGE UP
BUN SERPL-MCNC: 36 MG/DL — HIGH (ref 7–23)
CALCIUM SERPL-MCNC: 8.5 MG/DL — SIGNIFICANT CHANGE UP (ref 8.5–10.1)
CAST: 1 /LPF — SIGNIFICANT CHANGE UP (ref 0–4)
CHLORIDE SERPL-SCNC: 100 MMOL/L — SIGNIFICANT CHANGE UP (ref 96–108)
CO2 SERPL-SCNC: 31 MMOL/L — SIGNIFICANT CHANGE UP (ref 22–31)
COLOR SPEC: YELLOW — SIGNIFICANT CHANGE UP
CREAT SERPL-MCNC: 1.3 MG/DL — SIGNIFICANT CHANGE UP (ref 0.5–1.3)
CULTURE RESULTS: ABNORMAL
DIFF PNL FLD: ABNORMAL
EGFR: 40 ML/MIN/1.73M2 — LOW
EOSINOPHIL # BLD AUTO: 0.33 K/UL — SIGNIFICANT CHANGE UP (ref 0–0.5)
EOSINOPHIL NFR BLD AUTO: 2.5 % — SIGNIFICANT CHANGE UP (ref 0–6)
FLUAV AG NPH QL: SIGNIFICANT CHANGE UP
FLUBV AG NPH QL: SIGNIFICANT CHANGE UP
GLUCOSE SERPL-MCNC: 118 MG/DL — HIGH (ref 70–99)
GLUCOSE UR QL: NEGATIVE MG/DL — SIGNIFICANT CHANGE UP
HCT VFR BLD CALC: 34.7 % — SIGNIFICANT CHANGE UP (ref 34.5–45)
HGB BLD-MCNC: 11.7 G/DL — SIGNIFICANT CHANGE UP (ref 11.5–15.5)
IMM GRANULOCYTES NFR BLD AUTO: 2 % — HIGH (ref 0–0.9)
KETONES UR-MCNC: NEGATIVE MG/DL — SIGNIFICANT CHANGE UP
LACTATE SERPL-SCNC: 1.1 MMOL/L — SIGNIFICANT CHANGE UP (ref 0.7–2)
LEUKOCYTE ESTERASE UR-ACNC: ABNORMAL
LIDOCAIN IGE QN: 41 U/L — SIGNIFICANT CHANGE UP (ref 13–75)
LYMPHOCYTES # BLD AUTO: 1.18 K/UL — SIGNIFICANT CHANGE UP (ref 1–3.3)
LYMPHOCYTES # BLD AUTO: 8.9 % — LOW (ref 13–44)
MAGNESIUM SERPL-MCNC: 2.1 MG/DL — SIGNIFICANT CHANGE UP (ref 1.6–2.6)
MCHC RBC-ENTMCNC: 33.2 PG — SIGNIFICANT CHANGE UP (ref 27–34)
MCHC RBC-ENTMCNC: 33.7 G/DL — SIGNIFICANT CHANGE UP (ref 32–36)
MCV RBC AUTO: 98.6 FL — SIGNIFICANT CHANGE UP (ref 80–100)
MONOCYTES # BLD AUTO: 0.85 K/UL — SIGNIFICANT CHANGE UP (ref 0–0.9)
MONOCYTES NFR BLD AUTO: 6.4 % — SIGNIFICANT CHANGE UP (ref 2–14)
NEUTROPHILS # BLD AUTO: 10.54 K/UL — HIGH (ref 1.8–7.4)
NEUTROPHILS NFR BLD AUTO: 79.5 % — HIGH (ref 43–77)
NITRITE UR-MCNC: NEGATIVE — SIGNIFICANT CHANGE UP
ORGANISM # SPEC MICROSCOPIC CNT: ABNORMAL
ORGANISM # SPEC MICROSCOPIC CNT: ABNORMAL
ORGANISM # SPEC MICROSCOPIC CNT: SIGNIFICANT CHANGE UP
PH UR: 6 — SIGNIFICANT CHANGE UP (ref 5–8)
PLATELET # BLD AUTO: 213 K/UL — SIGNIFICANT CHANGE UP (ref 150–400)
POTASSIUM SERPL-MCNC: 4.1 MMOL/L — SIGNIFICANT CHANGE UP (ref 3.5–5.3)
POTASSIUM SERPL-SCNC: 4.1 MMOL/L — SIGNIFICANT CHANGE UP (ref 3.5–5.3)
PROT SERPL-MCNC: 6.7 GM/DL — SIGNIFICANT CHANGE UP (ref 6–8.3)
PROT UR-MCNC: NEGATIVE MG/DL — SIGNIFICANT CHANGE UP
RBC # BLD: 3.52 M/UL — LOW (ref 3.8–5.2)
RBC # FLD: 13.6 % — SIGNIFICANT CHANGE UP (ref 10.3–14.5)
RBC CASTS # UR COMP ASSIST: 13 /HPF — HIGH (ref 0–4)
RSV RNA NPH QL NAA+NON-PROBE: SIGNIFICANT CHANGE UP
SARS-COV-2 RNA SPEC QL NAA+PROBE: SIGNIFICANT CHANGE UP
SODIUM SERPL-SCNC: 135 MMOL/L — SIGNIFICANT CHANGE UP (ref 135–145)
SP GR SPEC: 1.01 — SIGNIFICANT CHANGE UP (ref 1–1.03)
SPECIMEN SOURCE: SIGNIFICANT CHANGE UP
SQUAMOUS # UR AUTO: 3 /HPF — SIGNIFICANT CHANGE UP (ref 0–5)
UROBILINOGEN FLD QL: 0.2 MG/DL — SIGNIFICANT CHANGE UP (ref 0.2–1)
WBC # BLD: 13.25 K/UL — HIGH (ref 3.8–10.5)
WBC # FLD AUTO: 13.25 K/UL — HIGH (ref 3.8–10.5)
WBC UR QL: 211 /HPF — HIGH (ref 0–5)

## 2025-01-02 PROCEDURE — 99285 EMERGENCY DEPT VISIT HI MDM: CPT

## 2025-01-02 PROCEDURE — 80053 COMPREHEN METABOLIC PANEL: CPT

## 2025-01-02 PROCEDURE — 36415 COLL VENOUS BLD VENIPUNCTURE: CPT

## 2025-01-02 PROCEDURE — 83880 ASSAY OF NATRIURETIC PEPTIDE: CPT

## 2025-01-02 PROCEDURE — 81001 URINALYSIS AUTO W/SCOPE: CPT

## 2025-01-02 PROCEDURE — 71045 X-RAY EXAM CHEST 1 VIEW: CPT

## 2025-01-02 PROCEDURE — 93005 ELECTROCARDIOGRAM TRACING: CPT

## 2025-01-02 PROCEDURE — 83690 ASSAY OF LIPASE: CPT

## 2025-01-02 PROCEDURE — 93010 ELECTROCARDIOGRAM REPORT: CPT

## 2025-01-02 PROCEDURE — 0241U: CPT

## 2025-01-02 PROCEDURE — 83735 ASSAY OF MAGNESIUM: CPT

## 2025-01-02 PROCEDURE — 71045 X-RAY EXAM CHEST 1 VIEW: CPT | Mod: 26

## 2025-01-02 PROCEDURE — 87086 URINE CULTURE/COLONY COUNT: CPT

## 2025-01-02 PROCEDURE — 36000 PLACE NEEDLE IN VEIN: CPT

## 2025-01-02 PROCEDURE — 87040 BLOOD CULTURE FOR BACTERIA: CPT | Mod: 59

## 2025-01-02 PROCEDURE — 83605 ASSAY OF LACTIC ACID: CPT

## 2025-01-02 PROCEDURE — 99285 EMERGENCY DEPT VISIT HI MDM: CPT | Mod: 25

## 2025-01-02 PROCEDURE — 85025 COMPLETE CBC W/AUTO DIFF WBC: CPT

## 2025-01-02 NOTE — ED ADULT NURSE NOTE - NSFALLHARMRISKINTERV_ED_ALL_ED

## 2025-01-02 NOTE — ED PROVIDER NOTE - PHYSICAL EXAMINATION
Constitutional: well appearing, NAD AAOx3  Eyes: EOMI, PERRL  Head: Normocephalic atraumatic  Mouth: no airway obstruction, posterior oropharynx clear without erythema or exudate  Neck: supple  Cardiac: regular rate and rhythm, no MRG  Resp: Lungs CTAB  GI: Abd s/nt/nd  Neuro: CN2-12 intact, strength 5/5x4, sensation grossly intact  Skin: No rashes Constitutional: well appearing, NAD AAOx3  Eyes: EOMI, PERRL  Head: Normocephalic atraumatic  Mouth: no airway obstruction, posterior oropharynx clear without erythema or exudate  Neck: supple  Cardiac: regular rate and rhythm, no MRG  Resp: Lungs CTAB  GI: Abd s/nt/nd  Neuro: CN2-12 intact, strength 5/5x4, sensation grossly intact  Skin: No rashes  MSK: no LE edema or tenderness

## 2025-01-02 NOTE — ED PROVIDER NOTE - NSFOLLOWUPINSTRUCTIONS_ED_ALL_ED_FT
Continue taking all prescribed medications. Follow up with your primary care doctor and Dr. Vigil within 48 hours. Return to the Emergency Department for worsening or persistent symptoms, and/or ANY NEW OR CONCERNING SYMPTOMS. If you have issues obtaining follow up, please call: [phone] or [phone]  to obtain a doctor or specialist who takes your insurance in your area.    Urinary Tract Infection, Female  The female urinary system, including the kidneys, ureters, bladder, and urethra.  A urinary tract infection (UTI) is an infection in your urinary tract. The urinary tract is made up of organs that make, store, and get rid of pee (urine) in your body. These organs include:  The kidneys.  The ureters.  The bladder.  The urethra.  What are the causes?  Most UTIs are caused by germs called bacteria. They may be in or near your genitals. These germs grow and cause swelling in your urinary tract.    What increases the risk?  You're more likely to get a UTI if:  You're a female. The urethra is shorter in females than in males.  You have a soft tube called a catheter that drains your pee.  You can't control when you pee or poop.  You have trouble peeing because of:  A kidney stone.  A urinary blockage.  A nerve condition that affects your bladder.  Not getting enough to drink.  You're sexually active.  You use a birth control inside your vagina, like spermicide.  You're pregnant.  You have low levels of the hormone estrogen in your body.  You're an older adult.  You're also more likely to get a UTI if you have other health problems. These may include:  Diabetes.  A weak immune system. Your immune system is your body's defense system.  Sickle cell disease.  Injury of the spine.  What are the signs or symptoms?  Symptoms may include:  Needing to pee right away.  Peeing small amounts often.  Pain or burning when you pee.  Blood in your pee.  Pee that smells bad or odd.  Pain in your belly or lower back.  You may also:  Feel confused. This may be the first symptom in older adults.  Vomit.  Not feel hungry.  Feel tired or easily annoyed.  Have a fever or chills.  How is this diagnosed?  A UTI is diagnosed based on your medical history and an exam. You may also have other tests. These may include:  Pee tests.  Blood tests.  Tests for sexually transmitted infections (STIs).  If you've had more than one UTI, you may need to have imaging studies done to find out why you keep getting them.    How is this treated?  A UTI can be treated by:  Taking antibiotics or other medicines.  Drinking enough fluid to keep your pee pale yellow.  In rare cases, a UTI can cause a very bad condition called sepsis. Sepsis may be treated in the hospital.    Follow these instructions at home:  Medicines    Take your medicines only as told by your health care provider.  If you were given antibiotics, take them as told by your provider. Do not stop taking them even if you start to feel better.  General instructions    Make sure you:  Pee often and fully. Do not hold your pee for a long time.  Wipe from front to back after you pee or poop. Use each tissue only once when you wipe.  Pee after you have sex.  Do not douche or use sprays or powders in your genital area.  Contact a health care provider if:  Your symptoms don't get better after 1–2 days of taking antibiotics.  Your symptoms go away and then come back.  You have a fever or chills.  You vomit or feel like you may vomit.  Get help right away if:  You have very bad pain in your back or lower belly.  You faint.

## 2025-01-02 NOTE — ED PROVIDER NOTE - OBJECTIVE STATEMENT
89yoF PMH recurrent urinary tract infections, atrial fibrillation status post cardioversion now with watchman (anticoagulation previously discontinued given hemorrhagic bursitis, renal hematoma 2019), hemophilia C, hypertension, hyperlipidemia, GERD, esophageal stricture/achalasia status post endoscopic POEM 2017, osteoarthritis bilateral knees and shoulders, degenerative disc disease, was spinal stenosis, sciatica, recently admitted and disharged from  on 12/30/24 for UTI presents with nausea, vomiting x1, brain fog and generalized weakness 89yoF PMH recurrent urinary tract infections, atrial fibrillation status post cardioversion now with watchman (anticoagulation previously discontinued given hemorrhagic bursitis, renal hematoma 2019), hemophilia C, hypertension, hyperlipidemia, GERD, esophageal stricture/achalasia status post endoscopic POEM 2017, osteoarthritis bilateral knees and shoulders, degenerative disc disease, was spinal stenosis, sciatica, recently admitted and discharged from  on 12/30/24 for UTI presents with nausea, vomiting x1, brain fog and generalized weakness. No fever, chills, cp, sob, urinary symptoms, abdominal pain, back pain, numbness tingling weakness, HA, rashes, leg pain or swelling. Pt has been compliant with Ceftin at home

## 2025-01-02 NOTE — ED PROVIDER NOTE - PATIENT PORTAL LINK FT
You can access the FollowMyHealth Patient Portal offered by [facility] by registering at the following website: [email or web address]. By joining [employer]s FollowMyHealth portal, you will also be able to view your health information using other applications (apps) compatible with our system.

## 2025-01-02 NOTE — ED PROVIDER NOTE - NS ED MD DISPO DISCHARGE CCDA
[address] 1.020 03/08/2020    GLUCOSEU Negative 03/08/2020    GLUCOSEU NEGATIVE 10/07/2011       Radiology:  No orders to display           Assessment/Plan:    Active Hospital Problems    Diagnosis    Coronary artery disease [I25.10]     Priority: High    Pneumonia [J18.9]    ESRD (end stage renal disease) ([facility] [address] [N18.6]    HTN (hypertension) [I10]    Type 2 diabetes mellitus with hyperglycemia, with long-term current use of insulin (HCC) [E11.65, Z79.4]       Acute onset of fever and cough in setting of pneumonia, likely aspiration  -Chest x-ray revealed right lower lobe pneumonia  -patient tested for COVID-19 d/t new onset fever/cough/pneumonia and resulted negative on 3/30  -all other testing pending  -prn tylenol  -cefepime and Vancomycin initiated on 3/28/2020  -encourage coughing and deep breathing  -incentive spirometer  -tessalon  -oxygen therapy protocol   -ID asked to assist mgmt    C. Difficile colitis- with assoc diarrhea  -Contact precaution  -Continued Flagyl and vanc      ESRD, HD M-W-F  -bmp monitored  -nephrology consulted - thank you     Essential HTN in setting of known CAD  -continued hydralazine and metoprolol  -continued asa     HLD  -continued rosuvastatin     DM2, controlled  -UG 483  -hemglobin a1c 6.1 on 2/21/2020  -continued lantus  -mdssi  -poct ac/hs  -hypoglycemia protocol  -carb control diet     Normocytic anemia, 11.0/34.2 on admission  -no s/s of bleeding at this time  -cbc in am  -continue ferrous sulfate          DVT Prophylaxis: coumadin  Diet: DIET CARB CONTROL; Low Sodium (2 GM);  Daily Fluid Restriction: 1000 ml  Dietary Nutrition Supplements: Diabetic Oral Supplement  Code Status: Full Code    PT/OT Eval Status: not yet ordered    Dispo - pending ID recs, off iv abx, ?by Robert Roach MD Patient/Caregiver provided printed discharge information.

## 2025-01-02 NOTE — ED PROVIDER NOTE - CLINICAL SUMMARY MEDICAL DECISION MAKING FREE TEXT BOX
Presentation concerning for UTI, PNA, dehydration.  EKG shows atrial paced rhythm, LAD, rate 63, no ST elevations or depressions.  Chest x-ray shows no acute infiltrate.  Labs show WBC 13, lactic acid within normal limits, creatinine within normal limits.  UA shows elevated WBC, no bacteria.  In the setting of patient being afebrile with normal lactic acid will consult Dr. Vigil infectious disease for recommendations, call placed by  at 3:05 PM Presentation concerning for UTI, PNA, dehydration.  EKG shows atrial paced rhythm, LAD, rate 63, no ST elevations or depressions.  Chest x-ray shows no acute infiltrate.  Labs show WBC 13, lactic acid within normal limits, creatinine within normal limits.  UA shows elevated WBC, no bacteria.  In the setting of patient being afebrile with normal lactic acid will consult Dr. Vigil infectious disease for recommendations, call placed by  at 3:05 PM. Case reviewed and discussed with Dr. Vigil in detail who recommends pt is safe for dc home on Ceftin. CXR shows no pulmonary congestion or acute infiltrate. On reassessment pt is afebrile and well appearing. All results reviewed with pt and daughter at bedside, comfortable with dc home at this time with close follow up with pcp and Dr. Vigil outpatient, given strict return precautions and dc in stable condition

## 2025-01-02 NOTE — ED ADULT NURSE NOTE - NSFALLCONCLUSION_ED_ALL_ED
10/9/2023        Cira DEWAYNE Varghesent  [id number] [facility] [id number]          Dear Cira,     Thank you for choosing [facility] for your prenatal care.  This is a very important time for both you and your baby. We are looking forward to working with you in the months to come. Our goal is to provide you with all the information and care you need for a healthy pregnancy.    Your first OB visit is scheduled with Barbara Sabillon MD  and nursing staff on November 8, 2023 @ 10:00 AM  at:  [facility]  [address] [place] [address]  [phone].    Our prenatal instructions are enclosed. Please keep them somewhere close for reference, as it has our contact information, as well as frequently asked questions.     We recommend that you check your maternity benefits with your insurance company. Please be aware that ultrasounds, radiology, lab work, and problem visits may fall outside of the global charge for pregnancy. If you have questions regarding this, please inform us.     If you have any questions prior to your first appointment, feel free to call our office. Please remember to bring your insurance card, identification, and the enclosed forms  with you to your appointment.       Sincerely,       Barbara Sabillon MD   Obstetrics and Gynecology   [facility]           
Fall with Harm Risk

## 2025-01-02 NOTE — ED ADULT TRIAGE NOTE - CHIEF COMPLAINT QUOTE
Pt bibdaughter s/p 1 episode of vomiting last night and a 99.1F temp. Pt was Dc'd here on monday for UTI, and sepsis. Per daughter, " She foggy and doesn't seem like she got any better". Pt A&ox4, utilizing wheelchair, no s/s of distress. Allergy to bactrim and Levaquin

## 2025-01-03 LAB
CULTURE RESULTS: NO GROWTH — SIGNIFICANT CHANGE UP
SPECIMEN SOURCE: SIGNIFICANT CHANGE UP

## 2025-01-06 DIAGNOSIS — N39.0 URINARY TRACT INFECTION, SITE NOT SPECIFIED: ICD-10-CM

## 2025-01-06 DIAGNOSIS — M48.00 SPINAL STENOSIS, SITE UNSPECIFIED: ICD-10-CM

## 2025-01-06 DIAGNOSIS — E54 ASCORBIC ACID DEFICIENCY: ICD-10-CM

## 2025-01-06 DIAGNOSIS — D68.1 HEREDITARY FACTOR XI DEFICIENCY: ICD-10-CM

## 2025-01-06 DIAGNOSIS — Z79.899 OTHER LONG TERM (CURRENT) DRUG THERAPY: ICD-10-CM

## 2025-01-06 DIAGNOSIS — M43.10 SPONDYLOLISTHESIS, SITE UNSPECIFIED: ICD-10-CM

## 2025-01-06 DIAGNOSIS — M19.012 PRIMARY OSTEOARTHRITIS, LEFT SHOULDER: ICD-10-CM

## 2025-01-06 DIAGNOSIS — M54.30 SCIATICA, UNSPECIFIED SIDE: ICD-10-CM

## 2025-01-06 DIAGNOSIS — M19.011 PRIMARY OSTEOARTHRITIS, RIGHT SHOULDER: ICD-10-CM

## 2025-01-06 DIAGNOSIS — Z79.01 LONG TERM (CURRENT) USE OF ANTICOAGULANTS: ICD-10-CM

## 2025-01-06 DIAGNOSIS — K22.0 ACHALASIA OF CARDIA: ICD-10-CM

## 2025-01-06 DIAGNOSIS — K59.00 CONSTIPATION, UNSPECIFIED: ICD-10-CM

## 2025-01-06 DIAGNOSIS — I50.9 HEART FAILURE, UNSPECIFIED: ICD-10-CM

## 2025-01-06 DIAGNOSIS — N31.9 NEUROMUSCULAR DYSFUNCTION OF BLADDER, UNSPECIFIED: ICD-10-CM

## 2025-01-06 DIAGNOSIS — B96.20 UNSPECIFIED ESCHERICHIA COLI [E. COLI] AS THE CAUSE OF DISEASES CLASSIFIED ELSEWHERE: ICD-10-CM

## 2025-01-06 DIAGNOSIS — Z95.0 PRESENCE OF CARDIAC PACEMAKER: ICD-10-CM

## 2025-01-06 DIAGNOSIS — I25.10 ATHEROSCLEROTIC HEART DISEASE OF NATIVE CORONARY ARTERY WITHOUT ANGINA PECTORIS: ICD-10-CM

## 2025-01-06 DIAGNOSIS — Z88.1 ALLERGY STATUS TO OTHER ANTIBIOTIC AGENTS: ICD-10-CM

## 2025-01-06 DIAGNOSIS — D50.9 IRON DEFICIENCY ANEMIA, UNSPECIFIED: ICD-10-CM

## 2025-01-06 DIAGNOSIS — J45.909 UNSPECIFIED ASTHMA, UNCOMPLICATED: ICD-10-CM

## 2025-01-06 DIAGNOSIS — Z79.52 LONG TERM (CURRENT) USE OF SYSTEMIC STEROIDS: ICD-10-CM

## 2025-01-06 DIAGNOSIS — I48.91 UNSPECIFIED ATRIAL FIBRILLATION: ICD-10-CM

## 2025-01-06 DIAGNOSIS — R82.81 PYURIA: ICD-10-CM

## 2025-01-06 DIAGNOSIS — Z11.52 ENCOUNTER FOR SCREENING FOR COVID-19: ICD-10-CM

## 2025-01-06 DIAGNOSIS — A41.9 SEPSIS, UNSPECIFIED ORGANISM: ICD-10-CM

## 2025-01-06 DIAGNOSIS — E78.5 HYPERLIPIDEMIA, UNSPECIFIED: ICD-10-CM

## 2025-01-06 DIAGNOSIS — E55.9 VITAMIN D DEFICIENCY, UNSPECIFIED: ICD-10-CM

## 2025-01-06 DIAGNOSIS — N17.9 ACUTE KIDNEY FAILURE, UNSPECIFIED: ICD-10-CM

## 2025-01-06 DIAGNOSIS — Z96.651 PRESENCE OF RIGHT ARTIFICIAL KNEE JOINT: ICD-10-CM

## 2025-01-06 DIAGNOSIS — K22.2 ESOPHAGEAL OBSTRUCTION: ICD-10-CM

## 2025-01-06 DIAGNOSIS — E80.7 DISORDER OF BILIRUBIN METABOLISM, UNSPECIFIED: ICD-10-CM

## 2025-01-06 DIAGNOSIS — Z98.41 CATARACT EXTRACTION STATUS, RIGHT EYE: ICD-10-CM

## 2025-01-06 DIAGNOSIS — K21.9 GASTRO-ESOPHAGEAL REFLUX DISEASE WITHOUT ESOPHAGITIS: ICD-10-CM

## 2025-01-06 DIAGNOSIS — I44.0 ATRIOVENTRICULAR BLOCK, FIRST DEGREE: ICD-10-CM

## 2025-01-06 DIAGNOSIS — I11.0 HYPERTENSIVE HEART DISEASE WITH HEART FAILURE: ICD-10-CM

## 2025-01-06 DIAGNOSIS — M17.0 BILATERAL PRIMARY OSTEOARTHRITIS OF KNEE: ICD-10-CM

## 2025-01-22 ENCOUNTER — APPOINTMENT (OUTPATIENT)
Dept: UROLOGY | Facility: CLINIC | Age: 89
End: 2025-01-22

## 2025-02-03 RX ORDER — CEFPODOXIME PROXETIL 200 MG/1
200 TABLET, FILM COATED ORAL
Qty: 14 | Refills: 1 | Status: ACTIVE | COMMUNITY
Start: 2025-02-03 | End: 1900-01-01

## 2025-02-03 RX ORDER — NITROFURANTOIN MACROCRYSTALS 50 MG/1
50 CAPSULE ORAL
Qty: 90 | Refills: 1 | Status: ACTIVE | COMMUNITY
Start: 2025-02-03 | End: 1900-01-01

## 2025-02-04 PROBLEM — R32 URINARY INCONTINENCE: Status: ACTIVE | Noted: 2025-02-04

## 2025-02-04 PROBLEM — Z87.891 FORMER SMOKER: Status: ACTIVE | Noted: 2025-02-04

## 2025-02-06 ENCOUNTER — APPOINTMENT (OUTPATIENT)
Dept: UROGYNECOLOGY | Facility: CLINIC | Age: 89
End: 2025-02-06

## 2025-02-06 DIAGNOSIS — N39.0 URINARY TRACT INFECTION, SITE NOT SPECIFIED: ICD-10-CM

## 2025-02-06 DIAGNOSIS — R32 UNSPECIFIED URINARY INCONTINENCE: ICD-10-CM

## 2025-02-06 DIAGNOSIS — Z87.891 PERSONAL HISTORY OF NICOTINE DEPENDENCE: ICD-10-CM

## 2025-02-10 ENCOUNTER — NON-APPOINTMENT (OUTPATIENT)
Age: 89
End: 2025-02-10

## 2025-02-19 ENCOUNTER — APPOINTMENT (OUTPATIENT)
Dept: UROLOGY | Facility: CLINIC | Age: 89
End: 2025-02-19
Payer: MEDICARE

## 2025-02-19 DIAGNOSIS — N39.0 URINARY TRACT INFECTION, SITE NOT SPECIFIED: ICD-10-CM

## 2025-02-19 PROCEDURE — G2211 COMPLEX E/M VISIT ADD ON: CPT | Mod: 2W

## 2025-02-19 PROCEDURE — 99213 OFFICE O/P EST LOW 20 MIN: CPT | Mod: 2W

## 2025-02-19 RX ORDER — CEFPODOXIME PROXETIL 200 MG/1
200 TABLET, FILM COATED ORAL
Qty: 6 | Refills: 1 | Status: ACTIVE | COMMUNITY
Start: 2025-02-19 | End: 1900-01-01

## 2025-03-17 ENCOUNTER — APPOINTMENT (OUTPATIENT)
Dept: UROGYNECOLOGY | Facility: CLINIC | Age: 89
End: 2025-03-17

## 2025-03-17 VITALS
WEIGHT: 164 LBS | DIASTOLIC BLOOD PRESSURE: 65 MMHG | HEART RATE: 85 BPM | HEIGHT: 61 IN | SYSTOLIC BLOOD PRESSURE: 142 MMHG | BODY MASS INDEX: 30.96 KG/M2

## 2025-03-17 DIAGNOSIS — Z82.3 FAMILY HISTORY OF STROKE: ICD-10-CM

## 2025-03-17 DIAGNOSIS — N39.0 URINARY TRACT INFECTION, SITE NOT SPECIFIED: ICD-10-CM

## 2025-03-17 DIAGNOSIS — R32 UNSPECIFIED URINARY INCONTINENCE: ICD-10-CM

## 2025-03-17 DIAGNOSIS — Z82.49 FAMILY HISTORY OF ISCHEMIC HEART DISEASE AND OTHER DISEASES OF THE CIRCULATORY SYSTEM: ICD-10-CM

## 2025-03-17 DIAGNOSIS — Z84.1 FAMILY HISTORY OF DISORDERS OF KIDNEY AND URETER: ICD-10-CM

## 2025-03-17 DIAGNOSIS — N39.46 MIXED INCONTINENCE: ICD-10-CM

## 2025-03-17 DIAGNOSIS — Z87.39 PERSONAL HISTORY OF OTHER DISEASES OF THE MUSCULOSKELETAL SYSTEM AND CONNECTIVE TISSUE: ICD-10-CM

## 2025-03-17 LAB
BILIRUB UR QL STRIP: NEGATIVE
CLARITY UR: CLEAR
COLLECTION METHOD: NORMAL
GLUCOSE UR-MCNC: NEGATIVE
HCG UR QL: 0.2 EU/DL
HGB UR QL STRIP.AUTO: NORMAL
KETONES UR-MCNC: NEGATIVE
LEUKOCYTE ESTERASE UR QL STRIP: NORMAL
NITRITE UR QL STRIP: NEGATIVE
PH UR STRIP: 5.5
PROT UR STRIP-MCNC: 30
SP GR UR STRIP: 1.01

## 2025-03-17 PROCEDURE — 99204 OFFICE O/P NEW MOD 45 MIN: CPT | Mod: 25

## 2025-03-17 PROCEDURE — 81003 URINALYSIS AUTO W/O SCOPE: CPT | Mod: QW

## 2025-03-17 PROCEDURE — 51701 INSERT BLADDER CATHETER: CPT | Mod: 59

## 2025-03-17 PROCEDURE — 99459 PELVIC EXAMINATION: CPT

## 2025-03-17 RX ORDER — FUROSEMIDE 40 MG/1
40 TABLET ORAL
Refills: 0 | Status: ACTIVE | COMMUNITY

## 2025-03-17 RX ORDER — ASPIRIN 325 MG/1
TABLET, FILM COATED ORAL
Refills: 0 | Status: ACTIVE | COMMUNITY

## 2025-03-17 RX ORDER — LORATADINE 10 MG
17 TABLET,DISINTEGRATING ORAL
Refills: 0 | Status: ACTIVE | COMMUNITY

## 2025-03-17 RX ORDER — ASCORBIC ACID 500 MG
500 TABLET ORAL
Refills: 0 | Status: ACTIVE | COMMUNITY

## 2025-03-17 RX ORDER — IRON,CARBONYL/CALCIUM 50-117MG
25 TABLET ORAL
Refills: 0 | Status: ACTIVE | COMMUNITY

## 2025-03-17 RX ORDER — FLUTICASONE PROPIONATE 50 MCG
SPRAY, SUSPENSION NASAL
Refills: 0 | Status: ACTIVE | COMMUNITY

## 2025-03-18 LAB
APPEARANCE: ABNORMAL
BACTERIA: ABNORMAL /HPF
BILIRUBIN URINE: NEGATIVE
BLOOD URINE: ABNORMAL
CAST: 3 /LPF
COLOR: YELLOW
EPITHELIAL CELLS: 10 /HPF
GLUCOSE QUALITATIVE U: NEGATIVE MG/DL
KETONES URINE: NEGATIVE MG/DL
LEUKOCYTE ESTERASE URINE: ABNORMAL
MICROSCOPIC-UA: NORMAL
NITRITE URINE: NEGATIVE
PH URINE: 6
PROTEIN URINE: 30 MG/DL
RED BLOOD CELLS URINE: 10 /HPF
REVIEW: NORMAL
SPECIFIC GRAVITY URINE: 1.02
UROBILINOGEN URINE: 0.2 MG/DL
WHITE BLOOD CELLS URINE: >998 /HPF
YEAST-LIKE CELLS: PRESENT

## 2025-03-21 RX ORDER — CEFPODOXIME PROXETIL 200 MG/1
200 TABLET, FILM COATED ORAL
Qty: 14 | Refills: 0 | Status: ACTIVE | COMMUNITY
Start: 2025-03-21 | End: 1900-01-01

## 2025-03-24 ENCOUNTER — APPOINTMENT (OUTPATIENT)
Dept: UROGYNECOLOGY | Facility: CLINIC | Age: 89
End: 2025-03-24
Payer: MEDICARE

## 2025-03-24 VITALS — DIASTOLIC BLOOD PRESSURE: 88 MMHG | SYSTOLIC BLOOD PRESSURE: 130 MMHG

## 2025-03-24 PROCEDURE — 51700 IRRIGATION OF BLADDER: CPT

## 2025-03-31 ENCOUNTER — APPOINTMENT (OUTPATIENT)
Dept: UROGYNECOLOGY | Facility: CLINIC | Age: 89
End: 2025-03-31
Payer: MEDICARE

## 2025-03-31 VITALS
SYSTOLIC BLOOD PRESSURE: 138 MMHG | HEART RATE: 86 BPM | RESPIRATION RATE: 16 BRPM | WEIGHT: 164 LBS | HEIGHT: 61 IN | BODY MASS INDEX: 30.96 KG/M2 | DIASTOLIC BLOOD PRESSURE: 84 MMHG

## 2025-03-31 DIAGNOSIS — N39.0 URINARY TRACT INFECTION, SITE NOT SPECIFIED: ICD-10-CM

## 2025-03-31 PROCEDURE — 51700 IRRIGATION OF BLADDER: CPT

## 2025-04-07 ENCOUNTER — APPOINTMENT (OUTPATIENT)
Dept: UROGYNECOLOGY | Facility: CLINIC | Age: 89
End: 2025-04-07
Payer: MEDICARE

## 2025-04-07 PROCEDURE — 51700 IRRIGATION OF BLADDER: CPT

## 2025-04-09 LAB
APPEARANCE: ABNORMAL
BACTERIA: NEGATIVE /HPF
BILIRUBIN URINE: NEGATIVE
BLOOD URINE: ABNORMAL
CAST: NORMAL /LPF
COLOR: YELLOW
EPITHELIAL CELLS: 3 /HPF
GLUCOSE QUALITATIVE U: NEGATIVE MG/DL
KETONES URINE: NEGATIVE MG/DL
LEUKOCYTE ESTERASE URINE: ABNORMAL
MICROSCOPIC-UA: NORMAL
NITRITE URINE: NEGATIVE
PH URINE: 6
PROTEIN URINE: 30 MG/DL
RED BLOOD CELLS URINE: 10 /HPF
REVIEW: NORMAL
SPECIFIC GRAVITY URINE: 1.02
UROBILINOGEN URINE: 0.2 MG/DL
WHITE BLOOD CELLS URINE: >998 /HPF

## 2025-04-11 LAB — BACTERIA UR CULT: ABNORMAL

## 2025-04-11 RX ORDER — CEFDINIR 300 MG/1
300 CAPSULE ORAL
Qty: 14 | Refills: 0 | Status: ACTIVE | COMMUNITY
Start: 2025-04-11 | End: 1900-01-01

## 2025-04-15 ENCOUNTER — APPOINTMENT (OUTPATIENT)
Dept: UROGYNECOLOGY | Facility: CLINIC | Age: 89
End: 2025-04-15
Payer: MEDICARE

## 2025-04-15 VITALS
WEIGHT: 164 LBS | HEART RATE: 88 BPM | BODY MASS INDEX: 30.96 KG/M2 | SYSTOLIC BLOOD PRESSURE: 146 MMHG | RESPIRATION RATE: 14 BRPM | HEIGHT: 61 IN | DIASTOLIC BLOOD PRESSURE: 78 MMHG

## 2025-04-15 PROCEDURE — 51700 IRRIGATION OF BLADDER: CPT

## 2025-04-21 ENCOUNTER — APPOINTMENT (OUTPATIENT)
Dept: UROGYNECOLOGY | Facility: CLINIC | Age: 89
End: 2025-04-21
Payer: MEDICARE

## 2025-04-21 VITALS
BODY MASS INDEX: 30.96 KG/M2 | WEIGHT: 164 LBS | HEIGHT: 61 IN | SYSTOLIC BLOOD PRESSURE: 148 MMHG | DIASTOLIC BLOOD PRESSURE: 92 MMHG | RESPIRATION RATE: 14 BRPM | HEART RATE: 96 BPM

## 2025-04-21 PROCEDURE — 51700 IRRIGATION OF BLADDER: CPT

## 2025-04-28 ENCOUNTER — APPOINTMENT (OUTPATIENT)
Dept: UROGYNECOLOGY | Facility: CLINIC | Age: 89
End: 2025-04-28
Payer: MEDICARE

## 2025-04-28 ENCOUNTER — NON-APPOINTMENT (OUTPATIENT)
Age: 89
End: 2025-04-28

## 2025-04-28 VITALS — SYSTOLIC BLOOD PRESSURE: 140 MMHG | DIASTOLIC BLOOD PRESSURE: 82 MMHG | HEART RATE: 95 BPM

## 2025-04-28 LAB
APPEARANCE: ABNORMAL
BACTERIA UR CULT: ABNORMAL
BACTERIA: NEGATIVE /HPF
BILIRUBIN URINE: NEGATIVE
BLOOD URINE: ABNORMAL
CAST: 4 /LPF
COLOR: YELLOW
EPITHELIAL CELLS: 4 /HPF
GLUCOSE QUALITATIVE U: NEGATIVE MG/DL
HYALINE CASTS: PRESENT
KETONES URINE: NEGATIVE MG/DL
LEUKOCYTE ESTERASE URINE: ABNORMAL
MICROSCOPIC-UA: NORMAL
NITRITE URINE: NEGATIVE
PH URINE: 6
PROTEIN URINE: 30 MG/DL
RED BLOOD CELLS URINE: 20 /HPF
REVIEW: NORMAL
SPECIFIC GRAVITY URINE: 1.02
UROBILINOGEN URINE: 0.2 MG/DL
WHITE BLOOD CELLS URINE: >998 /HPF

## 2025-04-28 PROCEDURE — 51700 IRRIGATION OF BLADDER: CPT

## 2025-05-08 ENCOUNTER — NON-APPOINTMENT (OUTPATIENT)
Age: 89
End: 2025-05-08

## 2025-05-14 ENCOUNTER — APPOINTMENT (OUTPATIENT)
Dept: UROGYNECOLOGY | Facility: CLINIC | Age: 89
End: 2025-05-14

## 2025-05-14 VITALS — SYSTOLIC BLOOD PRESSURE: 120 MMHG | DIASTOLIC BLOOD PRESSURE: 80 MMHG

## 2025-05-14 PROCEDURE — 51700 IRRIGATION OF BLADDER: CPT

## 2025-05-20 ENCOUNTER — APPOINTMENT (OUTPATIENT)
Dept: UROGYNECOLOGY | Facility: CLINIC | Age: 89
End: 2025-05-20
Payer: MEDICARE

## 2025-05-20 VITALS — SYSTOLIC BLOOD PRESSURE: 140 MMHG | DIASTOLIC BLOOD PRESSURE: 82 MMHG

## 2025-05-20 PROCEDURE — 51700 IRRIGATION OF BLADDER: CPT

## 2025-05-23 ENCOUNTER — APPOINTMENT (OUTPATIENT)
Dept: UROGYNECOLOGY | Facility: CLINIC | Age: 89
End: 2025-05-23
Payer: MEDICARE

## 2025-05-23 VITALS — HEART RATE: 93 BPM | SYSTOLIC BLOOD PRESSURE: 130 MMHG | DIASTOLIC BLOOD PRESSURE: 82 MMHG

## 2025-05-23 PROCEDURE — 51700 IRRIGATION OF BLADDER: CPT

## 2025-05-27 ENCOUNTER — RX RENEWAL (OUTPATIENT)
Age: 89
End: 2025-05-27

## 2025-05-28 ENCOUNTER — APPOINTMENT (OUTPATIENT)
Dept: UROGYNECOLOGY | Facility: CLINIC | Age: 89
End: 2025-05-28
Payer: MEDICARE

## 2025-05-28 PROCEDURE — 51700 IRRIGATION OF BLADDER: CPT

## 2025-05-30 LAB
APPEARANCE: ABNORMAL
BACTERIA: NEGATIVE /HPF
BILIRUBIN URINE: NEGATIVE
BLOOD URINE: ABNORMAL
COLOR: YELLOW
GLUCOSE QUALITATIVE U: NEGATIVE MG/DL
KETONES URINE: NEGATIVE MG/DL
LEUKOCYTE ESTERASE URINE: ABNORMAL
MICROSCOPIC-UA: NORMAL
NITRITE URINE: NEGATIVE
PH URINE: 6
PROTEIN URINE: 30 MG/DL
RED BLOOD CELLS URINE: 7 /HPF
SPECIFIC GRAVITY URINE: 1.02
TRANSITIONAL EPITHELIAL CELLS: PRESENT
UROBILINOGEN URINE: 0.2 MG/DL
WBC CLUMPS: PRESENT
WHITE BLOOD CELLS URINE: >998 /HPF

## 2025-06-04 ENCOUNTER — APPOINTMENT (OUTPATIENT)
Dept: UROGYNECOLOGY | Facility: CLINIC | Age: 89
End: 2025-06-04

## 2025-06-06 ENCOUNTER — APPOINTMENT (OUTPATIENT)
Dept: UROGYNECOLOGY | Facility: CLINIC | Age: 89
End: 2025-06-06

## 2025-06-09 ENCOUNTER — APPOINTMENT (OUTPATIENT)
Dept: UROGYNECOLOGY | Facility: CLINIC | Age: 89
End: 2025-06-09

## 2025-06-11 ENCOUNTER — APPOINTMENT (OUTPATIENT)
Dept: UROGYNECOLOGY | Facility: CLINIC | Age: 89
End: 2025-06-11
Payer: MEDICARE

## 2025-06-11 VITALS
DIASTOLIC BLOOD PRESSURE: 67 MMHG | WEIGHT: 164 LBS | HEART RATE: 88 BPM | SYSTOLIC BLOOD PRESSURE: 140 MMHG | HEIGHT: 60 IN | BODY MASS INDEX: 32.2 KG/M2

## 2025-06-11 PROCEDURE — 51700 IRRIGATION OF BLADDER: CPT

## 2025-06-11 PROCEDURE — 81003 URINALYSIS AUTO W/O SCOPE: CPT | Mod: QW

## 2025-06-12 LAB
APPEARANCE: ABNORMAL
BACTERIA: NEGATIVE /HPF
BILIRUB UR QL STRIP: NEGATIVE
BILIRUBIN URINE: NEGATIVE
BLOOD URINE: ABNORMAL
CAST: 30 /LPF
CLARITY UR: NORMAL
COLLECTION METHOD: NORMAL
COLOR: YELLOW
EPITHELIAL CELLS: 5 /HPF
GLUCOSE QUALITATIVE U: NEGATIVE MG/DL
GLUCOSE UR-MCNC: NEGATIVE
HCG UR QL: 0.2 EU/DL
HGB UR QL STRIP.AUTO: NORMAL
HYALINE CASTS: PRESENT
KETONES UR-MCNC: NEGATIVE
KETONES URINE: NEGATIVE MG/DL
LEUKOCYTE ESTERASE UR QL STRIP: NORMAL
LEUKOCYTE ESTERASE URINE: ABNORMAL
MICROSCOPIC-UA: NORMAL
NITRITE UR QL STRIP: NEGATIVE
NITRITE URINE: NEGATIVE
PH UR STRIP: 6
PH URINE: 6
PROT UR STRIP-MCNC: NORMAL
PROTEIN URINE: 30 MG/DL
RED BLOOD CELLS URINE: 22 /HPF
REVIEW: NORMAL
SP GR UR STRIP: 1.02
SPECIFIC GRAVITY URINE: 1.02
UROBILINOGEN URINE: 0.2 MG/DL
WHITE BLOOD CELLS URINE: 30 /HPF

## 2025-06-16 ENCOUNTER — APPOINTMENT (OUTPATIENT)
Dept: UROGYNECOLOGY | Facility: CLINIC | Age: 89
End: 2025-06-16

## 2025-06-16 LAB — BACTERIA UR CULT: ABNORMAL

## 2025-06-18 ENCOUNTER — APPOINTMENT (OUTPATIENT)
Dept: UROGYNECOLOGY | Facility: CLINIC | Age: 89
End: 2025-06-18

## 2025-06-19 ENCOUNTER — APPOINTMENT (OUTPATIENT)
Dept: UROGYNECOLOGY | Facility: CLINIC | Age: 89
End: 2025-06-19

## 2025-06-19 LAB
BILIRUB UR QL STRIP: NEGATIVE
CLARITY UR: CLEAR
COLLECTION METHOD: NORMAL
GLUCOSE UR-MCNC: NEGATIVE
HCG UR QL: 0.2 EU/DL
HGB UR QL STRIP.AUTO: NORMAL
KETONES UR-MCNC: NEGATIVE
LEUKOCYTE ESTERASE UR QL STRIP: NORMAL
NITRITE UR QL STRIP: NEGATIVE
PH UR STRIP: 5.5
PROT UR STRIP-MCNC: NORMAL
SP GR UR STRIP: 1.01

## 2025-06-19 PROCEDURE — 81003 URINALYSIS AUTO W/O SCOPE: CPT | Mod: QW

## 2025-06-19 PROCEDURE — 52000 CYSTOURETHROSCOPY: CPT

## 2025-07-07 ENCOUNTER — APPOINTMENT (OUTPATIENT)
Dept: UROGYNECOLOGY | Facility: CLINIC | Age: 89
End: 2025-07-07

## 2025-07-07 VITALS
BODY MASS INDEX: 22.96 KG/M2 | SYSTOLIC BLOOD PRESSURE: 134 MMHG | HEIGHT: 71 IN | WEIGHT: 164 LBS | DIASTOLIC BLOOD PRESSURE: 68 MMHG | HEART RATE: 88 BPM

## 2025-07-07 PROCEDURE — 51700 IRRIGATION OF BLADDER: CPT

## 2025-08-11 ENCOUNTER — APPOINTMENT (OUTPATIENT)
Dept: UROGYNECOLOGY | Facility: CLINIC | Age: 89
End: 2025-08-11
Payer: MEDICARE

## 2025-08-11 VITALS — SYSTOLIC BLOOD PRESSURE: 127 MMHG | HEART RATE: 64 BPM | DIASTOLIC BLOOD PRESSURE: 69 MMHG

## 2025-08-11 PROCEDURE — 51700 IRRIGATION OF BLADDER: CPT

## 2025-09-15 ENCOUNTER — APPOINTMENT (OUTPATIENT)
Dept: UROGYNECOLOGY | Facility: CLINIC | Age: 89
End: 2025-09-15
Payer: MEDICARE

## 2025-09-15 VITALS
BODY MASS INDEX: 30.96 KG/M2 | SYSTOLIC BLOOD PRESSURE: 136 MMHG | HEART RATE: 73 BPM | DIASTOLIC BLOOD PRESSURE: 62 MMHG | HEIGHT: 61 IN | WEIGHT: 164 LBS

## 2025-09-15 PROCEDURE — 51700 IRRIGATION OF BLADDER: CPT
